# Patient Record
Sex: FEMALE | Race: WHITE | NOT HISPANIC OR LATINO | Employment: FULL TIME | URBAN - METROPOLITAN AREA
[De-identification: names, ages, dates, MRNs, and addresses within clinical notes are randomized per-mention and may not be internally consistent; named-entity substitution may affect disease eponyms.]

---

## 2017-03-07 ENCOUNTER — OFFICE VISIT (OUTPATIENT)
Dept: OTOLARYNGOLOGY | Facility: CLINIC | Age: 22
End: 2017-03-07
Payer: COMMERCIAL

## 2017-03-07 ENCOUNTER — TELEPHONE (OUTPATIENT)
Dept: OTOLARYNGOLOGY | Facility: CLINIC | Age: 22
End: 2017-03-07

## 2017-03-07 VITALS
BODY MASS INDEX: 22.71 KG/M2 | TEMPERATURE: 98 F | DIASTOLIC BLOOD PRESSURE: 95 MMHG | HEIGHT: 66 IN | WEIGHT: 141.31 LBS | SYSTOLIC BLOOD PRESSURE: 136 MMHG | HEART RATE: 74 BPM | RESPIRATION RATE: 18 BRPM

## 2017-03-07 DIAGNOSIS — J35.01 CHRONIC TONSILLITIS: Primary | ICD-10-CM

## 2017-03-07 DIAGNOSIS — J35.8 TONSIL ASYMMETRY: ICD-10-CM

## 2017-03-07 PROCEDURE — 99999 PR PBB SHADOW E&M-NEW PATIENT-LVL III: CPT | Mod: PBBFAC,,, | Performed by: OTOLARYNGOLOGY

## 2017-03-07 PROCEDURE — 99204 OFFICE O/P NEW MOD 45 MIN: CPT | Mod: S$GLB,,, | Performed by: OTOLARYNGOLOGY

## 2017-03-07 RX ORDER — LEVONORGESTREL AND ETHINYL ESTRADIOL 0.1-0.02MG
1 KIT ORAL DAILY
COMMUNITY
End: 2018-07-25 | Stop reason: SDUPTHER

## 2017-03-07 RX ORDER — CELECOXIB 200 MG/1
200 CAPSULE ORAL 2 TIMES DAILY
Qty: 20 CAPSULE | Refills: 2 | Status: SHIPPED | OUTPATIENT
Start: 2017-03-07 | End: 2018-07-17

## 2017-03-07 RX ORDER — HYDROCODONE BITARTRATE AND ACETAMINOPHEN 7.5; 325 MG/15ML; MG/15ML
30 SOLUTION ORAL EVERY 6 HOURS PRN
Qty: 1000 ML | Refills: 0 | Status: SHIPPED | OUTPATIENT
Start: 2017-03-07 | End: 2017-03-17

## 2017-03-07 NOTE — MR AVS SNAPSHOT
[facility]  [address] [facility] [address]  Phone: [phone]  Fax: [phone]                  Elyse Martinez   3/7/2017 9:00 AM   Office Visit    Description:  Female : 1995   Provider:  Yossi Trujillo MD   Department:  [facility]           Reason for Visit     Other           Diagnoses this Visit        Comments    Chronic tonsillitis    -  Primary     Tonsil asymmetry                To Do List           Goals (5 Years of Data)     None       These Medications        Disp Refills Start End    hydrocodone-acetaminophen (HYCET) solution 7.5-325 mg/15mL 1000 mL 0 3/7/2017 3/17/2017    Take 30 mLs by mouth every 6 (six) hours as needed for Pain. - Oral    Pharmacy: [facility] [address] Ph #: [phone]       celecoxib (CELEBREX) 200 MG capsule 20 capsule 2 3/7/2017     Take 1 capsule (200 mg total) by mouth 2 (two) times daily. - Oral    Pharmacy: [facility] - [place], [place] - [address] Ph #: [phone]         [facility]     [facility] -  Assistance  Registered nurses in the [facility] provide clinical advisement, health education, appointment booking, and other advisory services.  Call for this free service at [phone].             Medications           Message regarding Medications     Verify the changes and/or additions to your medication regime listed below are the same as discussed with your clinician today.  If any of these changes or additions are incorrect, please notify your healthcare provider.        START taking these NEW medications        Refills    hydrocodone-acetaminophen (HYCET) solution 7.5-325 mg/15mL 0    Sig: Take 30 mLs by mouth every 6 (six) hours as needed for Pain.    Class: Normal    Route: Oral    celecoxib (CELEBREX) 200 MG capsule 2    Sig: Take 1 capsule (200 mg total) by mouth 2  "(two) times daily.    Class: Normal    Route: Oral           Verify that the below list of medications is an accurate representation of the medications you are currently taking.  If none reported, the list may be blank. If incorrect, please contact your healthcare provider. Carry this list with you in case of emergency.           Current Medications     levonorgestrel-ethinyl estradiol (AVIANE,ALESSE,LESSINA) 0.1-20 mg-mcg per tablet Take 1 tablet by mouth once daily.    celecoxib (CELEBREX) 200 MG capsule Take 1 capsule (200 mg total) by mouth 2 (two) times daily.    hydrocodone-acetaminophen (HYCET) solution 7.5-325 mg/15mL Take 30 mLs by mouth every 6 (six) hours as needed for Pain.           Clinical Reference Information           Your Vitals Were     BP Pulse Temp Resp Height Weight    136/95 74 98.1 °F (36.7 °C) (Tympanic) 18 5' 6" (1.676 m) 64.1 kg (141 lb 5 oz)    BMI                22.81 kg/m2          Blood Pressure          Most Recent Value    BP  (!)  136/95      Allergies as of 3/7/2017     No Known Allergies      Immunizations Administered on Date of Encounter - 3/7/2017     None      Instructions        Adult Tonsillectomy  The tonsils are two small masses of tissue at the back of the throat. They are part of the bodys immune system, which helps the body fight disease. In some people, the tonsils become infected or enlarged. This can cause severe sore throats, snoring, or other problems. Tonsillectomy is surgery to remove the tonsils. This sheet tells you more about this surgery and what to expect.    Preparing for Surgery  Prepare as you have been told. Tell your doctor about all medications you take. This includes over-the-counter drugs. It also includes herbs and other supplements. You may need to stop taking some or all of them before surgery. Also, follow any directions youre given for not eating or drinking before surgery.    The Day of Surgery  YOU MAY TAKE YOUR MEDICATIONS WITH A SIP OF " WATER THE MORNING OF SURGERY, BUT OTHER THAN THAT  DO NOT EAT OR DRINK THE DAY OF SURGERY  The surgery takes about 60 minutes. You will likely go home on the same day.  Before the surgery begins:  · An IV line is put into a vein in your arm or hand. This line supplies fluids and medications.  · To keep you free of pain during the surgery, youre given general anesthesia. This medication puts you into a state like deep sleep through the surgery.  During the surgery:  · A special device is used to keep the mouth open.  · Other tools are used to remove the tonsils from the back of the throat. The tissue is taken out through the mouth.  · The device holding the mouth open is then removed.  After the surgery: You will be taken to a recovery room. Healthcare staff will make sure you can drink some liquids. They will also make sure your pain is being managed. When you are ready to leave the hospital, you will need to be driven home by an adult family member or friend.  Recovering at Home  It will likely take about 3 weeks to heal from the surgery. During your recovery:  · Expect to have significant throat pain. You may also feel pain in your ears. This is referred pain from the throat, and is normal. Your post-surgery pain may come and go. It may be worse on the 4th or 5th day after surgery.  · Talk as little as possible, if it is painful.  · Take pain medication as directed.  · Do not drive while you are on narcotic pain medication. Expect to feel sleepy or dizzy while you are taking this medication.  · Do not use aspirin for 14 days after surgery unless your doctor says its okay.  · Use 2 or 3 pillows under your head while resting. This will help keep swelling down.  · Drink lots of chilled liquids. Water, non-citrus juices, and frozen juice bars are good choices.  · Eat cold foods and soft foods, which are easiest to swallow. Try foods such as ice cream, gelatin, scrambled eggs, pasta, and mashed potatoes.  · Avoid  foods that require a lot of chewing. Also avoid foods that may scratch the throat, such as toast or potato chips. Avoid hot, spicy, or acidic foods.  · Avoid strenuous activity for 2-3 weeks after surgery.  · Be aware that white patches will form in the throat during healing. These are scabs and are not a sign of infection. The patches will come off in 1-2 weeks and may cause bleeding. To minimize bleeding, drink lots of fluids. Gargling with cold water can help.  Call the Doctor If You Have Any of the Following:  · Fever of 100.4°F (38°C) or higher, or as directed by your healthcare provider  · Bright red bleeding from the mouth or nose  · Signs of dehydration (dark urine, urinating less often)  · Heavy or persistent bleeding in the throat at any time  · Other signs or symptoms as indicated by your doctor   Follow-Up  Schedule a follow-up visit with your doctor for 1 month after surgery. During this visit, the doctor will make sure you are healing well. Ask any questions you have about the surgery or your recovery.  Risks and Possible Complications Include:  · Infection  · Bleeding  · Injury to the lips or teeth  · Painful swallowing during recovery  · The need for a second surgery  · Risks of anesthesia (the anesthesiologist will discuss these with you)                  Language Assistance Services     ATTENTION: Language assistance services are available, free of charge. Please call [phone].      ATENCIÓN: Si habla apolinar, tiene a mcgarry disposición servicios gratuitos de asistencia lingüística. Llame al [phone].     ANDREA Ý: N?u b?n nói Ti?ng Vi?t, có các d?ch v? h? tr? ngôn ng? mi?n phí dành cho b?n. G?i s? [phone].         [facility] complies with applicable Federal civil rights laws and does not discriminate on the basis of race, color, national origin, age, disability, or sex.

## 2017-03-07 NOTE — TELEPHONE ENCOUNTER
Patient will contact us back to schedule tonsillectomy with Dr. Trujillo at her convenience. Consents have been signed for surgery.

## 2017-03-07 NOTE — PATIENT INSTRUCTIONS
Adult Tonsillectomy  The tonsils are two small masses of tissue at the back of the throat. They are part of the bodys immune system, which helps the body fight disease. In some people, the tonsils become infected or enlarged. This can cause severe sore throats, snoring, or other problems. Tonsillectomy is surgery to remove the tonsils. This sheet tells you more about this surgery and what to expect.    Preparing for Surgery  Prepare as you have been told. Tell your doctor about all medications you take. This includes over-the-counter drugs. It also includes herbs and other supplements. You may need to stop taking some or all of them before surgery. Also, follow any directions youre given for not eating or drinking before surgery.    The Day of Surgery  YOU MAY TAKE YOUR MEDICATIONS WITH A SIP OF WATER THE MORNING OF SURGERY, BUT OTHER THAN THAT  DO NOT EAT OR DRINK THE DAY OF SURGERY  The surgery takes about 60 minutes. You will likely go home on the same day.  Before the surgery begins:  · An IV line is put into a vein in your arm or hand. This line supplies fluids and medications.  · To keep you free of pain during the surgery, youre given general anesthesia. This medication puts you into a state like deep sleep through the surgery.  During the surgery:  · A special device is used to keep the mouth open.  · Other tools are used to remove the tonsils from the back of the throat. The tissue is taken out through the mouth.  · The device holding the mouth open is then removed.  After the surgery: You will be taken to a recovery room. Healthcare staff will make sure you can drink some liquids. They will also make sure your pain is being managed. When you are ready to leave the hospital, you will need to be driven home by an adult family member or friend.  Recovering at Home  It will likely take about 3 weeks to heal from the surgery. During your recovery:  · Expect to have significant throat pain. You may also  feel pain in your ears. This is referred pain from the throat, and is normal. Your post-surgery pain may come and go. It may be worse on the 4th or 5th day after surgery.  · Talk as little as possible, if it is painful.  · Take pain medication as directed.  · Do not drive while you are on narcotic pain medication. Expect to feel sleepy or dizzy while you are taking this medication.  · Do not use aspirin for 14 days after surgery unless your doctor says its okay.  · Use 2 or 3 pillows under your head while resting. This will help keep swelling down.  · Drink lots of chilled liquids. Water, non-citrus juices, and frozen juice bars are good choices.  · Eat cold foods and soft foods, which are easiest to swallow. Try foods such as ice cream, gelatin, scrambled eggs, pasta, and mashed potatoes.  · Avoid foods that require a lot of chewing. Also avoid foods that may scratch the throat, such as toast or potato chips. Avoid hot, spicy, or acidic foods.  · Avoid strenuous activity for 2-3 weeks after surgery.  · Be aware that white patches will form in the throat during healing. These are scabs and are not a sign of infection. The patches will come off in 1-2 weeks and may cause bleeding. To minimize bleeding, drink lots of fluids. Gargling with cold water can help.  Call the Doctor If You Have Any of the Following:  · Fever of 100.4°F (38°C) or higher, or as directed by your healthcare provider  · Bright red bleeding from the mouth or nose  · Signs of dehydration (dark urine, urinating less often)  · Heavy or persistent bleeding in the throat at any time  · Other signs or symptoms as indicated by your doctor   Follow-Up  Schedule a follow-up visit with your doctor for 1 month after surgery. During this visit, the doctor will make sure you are healing well. Ask any questions you have about the surgery or your recovery.  Risks and Possible Complications Include:  · Infection  · Bleeding  · Injury to the lips or  teeth  · Painful swallowing during recovery  · The need for a second surgery  · Risks of anesthesia (the anesthesiologist will discuss these with you)

## 2017-03-08 NOTE — PROGRESS NOTES
Referring Provider:    No referring provider defined for this encounter.  Subjective:   Patient: Elyse Martinez [id number], :1995   Visit date:3/7/2017 7:27 AM    Chief Complaint:  Other (patient having trouble with her tonsils.)    HPI:  Elyse is a 21 y.o. female who I was asked to see in consultation for evaluation of the following issue(s):    Elyse is a very pleasant 21-year-old here to see me for the first time.  She works in the emergency room here in our hospital.  She reports that for the past few years, she has had chronic pain in the tonsillar area.  She has frequent stones extrude from the tonsils.  The left side she, she has actually had some significant bleeding when the stones have spontaneously extruded.  She has been on numerous rounds of antibiotics through an outside physician without significant improvement.  She feels like it is almost always the left side that is most bothersome.  Pain is typically mild to moderate crampy in nature but can sometimes become quite severe.    Review of Systems:  Negative unless checked off.  Gen:  []fever   []fatigue  HENT:  []nosebleeds  []dental problem   Eyes:  []photophobia  []visual disturbance  Resp:  []chest tightness []wheezing  Card:  []chest pain  []leg swelling  GI:  []abdominal pain []blood in stool  :  []dysuria  []hematuria  Musc:  []joint swelling  []gait problem  Skin:  []color change  []pallor  Neuro:  []seizures  []numbness  Hem:  []bruise/bleed easily  Psych:  []hallucinations  []behavioral problems  Allergy/Imm: has No Known Allergies.    Her meds, allergies, medical, surgical, social & family histories were reviewed & updated:  -     She has a current medication list which includes the following prescription(s): levonorgestrel-ethinyl estradiol, celecoxib, and hydrocodone-apap 2.5-108 mg/5 ml.  -     She  has no past medical history on file.   -     She  does not have a problem list on file.   -     She  has no past surgical history  "on file.  -     She  reports that she has never smoked. She does not have any smokeless tobacco history on file.  -     Her family history is not on file.  -     She has No Known Allergies.    Objective:     Physical Exam:  Vitals:  BP (!) 136/95  Pulse 74  Temp 98.1 °F (36.7 °C) (Tympanic)   Resp 18  Ht 5' 6" (1.676 m)  Wt 64.1 kg (141 lb 5 oz)  BMI 22.81 kg/m2  General appearance:  Well developed, well nourished    Eyes:  Extraocular motions intact, PERRL    Communication:  no hoarseness, no dysphonia    Ears:  Otoscopy of external auditory canals and tympanic membranes was normal, clinical speech reception thresholds grossly intact, no mass/lesion of auricle.  Nose:  No masses/lesions of external nose, nasal mucosa, septum, and turbinates were within normal limits.  Mouth: Left tonsil is significantly larger than the right.  There is moderate inflammation as well as some infectious debris on the left side.      Cardiovascular:  No pedal edema; Radial Pulses +2     Neck & Lymphatics:  No cervical lymphadenopathy, no neck mass/crepitus/ asymmetry, trachea is midline, no thyroid enlargement/tenderness/mass.    Psych: Oriented x3,  Alert with normal mood and affect.     Respiration/Chest:  Symmetric expansion during respiration, normal respiratory effort.    Skin:  Warm and intact. No ulcerations of face, scalp, neck.    Assessment & Plan:   Elyse was seen today for other.    Diagnoses and all orders for this visit:    Chronic tonsillitis    Tonsil asymmetry    Other orders  -     hydrocodone-acetaminophen (HYCET) solution 7.5-325 mg/15mL; Take 30 mLs by mouth every 6 (six) hours as needed for Pain.  -     celecoxib (CELEBREX) 200 MG capsule; Take 1 capsule (200 mg total) by mouth 2 (two) times daily.      Recommend tonsillectomy for asymmetry and chronic tonsillitis.       The risks and benefits of surgery were discussed at length including, but not limited to, respiratory distress, hemorrhage, regurgitation " of food/liquids into the nasopharynx, voice changes, dehydration and pain.  The patient had the opportunity to ask questions to their satisfaction.          We discussed her medical conditions, treatments and plan.  Elyse should return to clinic if any issues arise (symptoms worsen or persist), otherwise we will see her back in the clinic after surgery.    Thank you for allowing me to participate in the care of Elyse.    Yossi Trujillo MD

## 2017-03-27 ENCOUNTER — TELEPHONE (OUTPATIENT)
Dept: OTOLARYNGOLOGY | Facility: CLINIC | Age: 22
End: 2017-03-27

## 2017-03-27 DIAGNOSIS — J35.8 TONSIL ASYMMETRY: ICD-10-CM

## 2017-03-27 DIAGNOSIS — J35.01 CHRONIC TONSILLITIS: Primary | ICD-10-CM

## 2017-03-27 NOTE — TELEPHONE ENCOUNTER
Attempted return call to patient. No voicemail setup to leave a message. Upon last office visit patient will contact us when she is ready to pick a surgery date. Consents have been signed previously. Will try back later.

## 2017-03-27 NOTE — TELEPHONE ENCOUNTER
----- Message from Orlando Hutton sent at 3/24/2017  3:13 PM CDT -----  Contact: Patient  Please call patient back regarding questions about her surgery.  Pt can be reached @ ..[phone] (home) Thank you/NH

## 2017-05-30 ENCOUNTER — TELEPHONE (OUTPATIENT)
Dept: OTOLARYNGOLOGY | Facility: CLINIC | Age: 22
End: 2017-05-30

## 2017-05-30 RX ORDER — OXYCODONE HCL 5 MG/5 ML
5 SOLUTION, ORAL ORAL EVERY 4 HOURS PRN
Qty: 350 ML | Refills: 0 | Status: SHIPPED | OUTPATIENT
Start: 2017-05-30 | End: 2018-07-17

## 2017-05-30 NOTE — TELEPHONE ENCOUNTER
Spoke with patient to confirm 2 scripts to  from pharmacy. Spoke with Robert at [facility] to verify.

## 2017-05-30 NOTE — TELEPHONE ENCOUNTER
----- Message from Rose Moyer sent at 5/30/2017  4:54 PM CDT -----  Pt at [phone]//she is calling to ask if she is suppose to get 2 scripts or just one//she knows she is getting a pain med but not sure about the other one//please call//lorene/joel

## 2017-05-31 ENCOUNTER — TELEPHONE (OUTPATIENT)
Dept: OTOLARYNGOLOGY | Facility: CLINIC | Age: 22
End: 2017-05-31

## 2017-05-31 NOTE — PRE-PROCEDURE INSTRUCTIONS
Pre op instructions reviewed with patient per phone:    To confirm, Your surgeon has instructed you:  Surgery is scheduled 6/1/17at ENT.      [facility] main lobby by E[facility].      INSTRUCTIONS IMPORTANT!!!  ¨ Do not eat, drink, or smoke after 12 midnight-including water. OK to brush teeth, no gum, candy or mints!    ¨ Take only these medicines with a small swallow of water-morning of surgery.  NONE  ____  Do not wear makeup, including mascara.  ____  No powder, lotions or creams to surgical area.  ____  Please remove all jewelry, including piercings and leave at home.  ____  No money or valuables needed. Please leave at home.  ____  Please bring identification and insurance information to hospital.  ____  If going home the same day, arrange for a ride home. You will not be able to   drive if Anesthesia was used.  ____  Children, under 12 years old, must remain in the waiting room with an adult.  They are not allowed in patient areas.  ____  Wear loose fitting clothing. Allow for dressings, bandages.  ____  Stop Aspirin, Ibuprofen, Motrin and Aleve at least 5-7 days before surgery, unless otherwise instructed by your doctor, or the nurse.   You MAY use Tylenol/acetaminophen until day of surgery.  ____  If you take diabetic medication, do not take am of surgery unless instructed by   Doctor.  ____ Stop taking any Fish Oil supplement or any Vitamins that contain Vitamin E at least 5 days prior to surgery.          Bathing Instructions-- The night before surgery and the morning prior to coming to the hospital:   -Do not shave the surgical area.   -Shower and wash your hair and body as usual with anti-bacterial  soap and shampoo.   -Rinse your hair and body completely.   -Use one packet of hibiclens to wash the surgical site (using your hand) gently for 5 minutes.  Do not scrub you skin too hard.   -Do not use hibiclens on your head, face, or genitals.   -Do not wash with  anti-bacterial soap after you use the hibiclens.   -Rinse your body thoroughly.   -Dry with clean, soft towel.  Do not use lotion, cream, deodorant, or powders on   the surgical site.    Use antibacterial soap in place of hibiclens if your surgery is on the head, face or genitals.         Surgical Site Infection    Prevention of surgical site infections:     -Keep incisions clean and dry.   -Do not soak/submerge incisions in water until completely healed.   -Do not apply lotions, powders, creams, or deodorants to site.   -Always make sure hands are cleaned with antibacterial soap/ alcohol-based   prior to touching the surgical site.  (This includes doctors, nurses, staff, and yourself.)    Signs and symptoms:   -Redness and pain around the area where you had surgery   -Drainage of cloudy fluid from your surgical wound   -Fever over 100.4  I have read or had read and explained to me, and understand the above information.

## 2017-05-31 NOTE — TELEPHONE ENCOUNTER
Surgery arrival time of 7:00 am with Dr. Trujillo for 6/1/17 confirmed with patient. Reminder of NPO at midnight with verbal understanding.

## 2017-06-01 ENCOUNTER — ANESTHESIA (OUTPATIENT)
Dept: SURGERY | Facility: HOSPITAL | Age: 22
End: 2017-06-01
Payer: COMMERCIAL

## 2017-06-01 ENCOUNTER — HOSPITAL ENCOUNTER (OUTPATIENT)
Facility: HOSPITAL | Age: 22
Discharge: HOME | End: 2017-06-01
Attending: OTOLARYNGOLOGY | Admitting: OTOLARYNGOLOGY
Payer: COMMERCIAL

## 2017-06-01 ENCOUNTER — SURGERY (OUTPATIENT)
Age: 22
End: 2017-06-01

## 2017-06-01 ENCOUNTER — ANESTHESIA EVENT (OUTPATIENT)
Dept: SURGERY | Facility: HOSPITAL | Age: 22
End: 2017-06-01
Payer: COMMERCIAL

## 2017-06-01 VITALS
BODY MASS INDEX: 22.39 KG/M2 | TEMPERATURE: 98 F | RESPIRATION RATE: 18 BRPM | OXYGEN SATURATION: 96 % | HEART RATE: 68 BPM | HEIGHT: 66 IN | DIASTOLIC BLOOD PRESSURE: 53 MMHG | WEIGHT: 139.31 LBS | SYSTOLIC BLOOD PRESSURE: 113 MMHG

## 2017-06-01 DIAGNOSIS — J35.01 CHRONIC TONSILLITIS: Primary | ICD-10-CM

## 2017-06-01 DIAGNOSIS — J35.8 TONSIL ASYMMETRY: ICD-10-CM

## 2017-06-01 LAB
B-HCG UR QL: NEGATIVE
CTP QC/QA: YES

## 2017-06-01 PROCEDURE — 71000033 HC RECOVERY, INTIAL HOUR: Performed by: OTOLARYNGOLOGY

## 2017-06-01 PROCEDURE — 36000706: Performed by: OTOLARYNGOLOGY

## 2017-06-01 PROCEDURE — 25000003 PHARM REV CODE 250: Performed by: ANESTHESIOLOGY

## 2017-06-01 PROCEDURE — 36000707: Performed by: OTOLARYNGOLOGY

## 2017-06-01 PROCEDURE — 88304 TISSUE EXAM BY PATHOLOGIST: CPT | Performed by: PATHOLOGY

## 2017-06-01 PROCEDURE — 63600175 PHARM REV CODE 636 W HCPCS: Performed by: NURSE ANESTHETIST, CERTIFIED REGISTERED

## 2017-06-01 PROCEDURE — 71000015 HC POSTOP RECOV 1ST HR: Performed by: OTOLARYNGOLOGY

## 2017-06-01 PROCEDURE — 88304 TISSUE EXAM BY PATHOLOGIST: CPT | Mod: 26,,, | Performed by: PATHOLOGY

## 2017-06-01 PROCEDURE — 42826 REMOVAL OF TONSILS: CPT | Mod: ,,, | Performed by: OTOLARYNGOLOGY

## 2017-06-01 PROCEDURE — 81025 URINE PREGNANCY TEST: CPT | Performed by: OTOLARYNGOLOGY

## 2017-06-01 PROCEDURE — 25000003 PHARM REV CODE 250: Performed by: NURSE ANESTHETIST, CERTIFIED REGISTERED

## 2017-06-01 PROCEDURE — 37000008 HC ANESTHESIA 1ST 15 MINUTES: Performed by: OTOLARYNGOLOGY

## 2017-06-01 PROCEDURE — 27201423 OPTIME MED/SURG SUP & DEVICES STERILE SUPPLY: Performed by: OTOLARYNGOLOGY

## 2017-06-01 PROCEDURE — 37000009 HC ANESTHESIA EA ADD 15 MINS: Performed by: OTOLARYNGOLOGY

## 2017-06-01 PROCEDURE — 63600175 PHARM REV CODE 636 W HCPCS: Performed by: ANESTHESIOLOGY

## 2017-06-01 RX ORDER — ROCURONIUM BROMIDE 10 MG/ML
INJECTION, SOLUTION INTRAVENOUS
Status: DISCONTINUED | OUTPATIENT
Start: 2017-06-01 | End: 2017-06-01

## 2017-06-01 RX ORDER — HYDROCODONE BITARTRATE AND ACETAMINOPHEN 5; 325 MG/1; MG/1
1 TABLET ORAL EVERY 4 HOURS PRN
Status: DISCONTINUED | OUTPATIENT
Start: 2017-06-01 | End: 2017-06-01 | Stop reason: HOSPADM

## 2017-06-01 RX ORDER — LIDOCAINE HCL/PF 100 MG/5ML
SYRINGE (ML) INTRAVENOUS
Status: DISCONTINUED | OUTPATIENT
Start: 2017-06-01 | End: 2017-06-01

## 2017-06-01 RX ORDER — ONDANSETRON 2 MG/ML
4 INJECTION INTRAMUSCULAR; INTRAVENOUS DAILY PRN
Status: DISCONTINUED | OUTPATIENT
Start: 2017-06-01 | End: 2017-06-01 | Stop reason: HOSPADM

## 2017-06-01 RX ORDER — OXYCODONE HYDROCHLORIDE 5 MG/1
5 TABLET ORAL
Status: DISCONTINUED | OUTPATIENT
Start: 2017-06-01 | End: 2017-06-01 | Stop reason: HOSPADM

## 2017-06-01 RX ORDER — SODIUM CHLORIDE, SODIUM LACTATE, POTASSIUM CHLORIDE, CALCIUM CHLORIDE 600; 310; 30; 20 MG/100ML; MG/100ML; MG/100ML; MG/100ML
INJECTION, SOLUTION INTRAVENOUS CONTINUOUS PRN
Status: DISCONTINUED | OUTPATIENT
Start: 2017-06-01 | End: 2017-06-01

## 2017-06-01 RX ORDER — PROPOFOL 10 MG/ML
VIAL (ML) INTRAVENOUS
Status: DISCONTINUED | OUTPATIENT
Start: 2017-06-01 | End: 2017-06-01

## 2017-06-01 RX ORDER — HYDROMORPHONE HYDROCHLORIDE 2 MG/ML
0.2 INJECTION, SOLUTION INTRAMUSCULAR; INTRAVENOUS; SUBCUTANEOUS EVERY 5 MIN PRN
Status: DISCONTINUED | OUTPATIENT
Start: 2017-06-01 | End: 2017-06-01 | Stop reason: HOSPADM

## 2017-06-01 RX ORDER — DEXAMETHASONE SODIUM PHOSPHATE 4 MG/ML
INJECTION, SOLUTION INTRA-ARTICULAR; INTRALESIONAL; INTRAMUSCULAR; INTRAVENOUS; SOFT TISSUE
Status: DISCONTINUED | OUTPATIENT
Start: 2017-06-01 | End: 2017-06-01

## 2017-06-01 RX ORDER — MIDAZOLAM HYDROCHLORIDE 1 MG/ML
INJECTION, SOLUTION INTRAMUSCULAR; INTRAVENOUS
Status: DISCONTINUED | OUTPATIENT
Start: 2017-06-01 | End: 2017-06-01

## 2017-06-01 RX ORDER — FENTANYL CITRATE 50 UG/ML
25 INJECTION, SOLUTION INTRAMUSCULAR; INTRAVENOUS EVERY 5 MIN PRN
Status: DISCONTINUED | OUTPATIENT
Start: 2017-06-01 | End: 2017-06-01 | Stop reason: HOSPADM

## 2017-06-01 RX ORDER — SODIUM CHLORIDE 9 MG/ML
3 INJECTION, SOLUTION INTRAMUSCULAR; INTRAVENOUS; SUBCUTANEOUS
Status: DISCONTINUED | OUTPATIENT
Start: 2017-06-01 | End: 2017-06-01 | Stop reason: HOSPADM

## 2017-06-01 RX ORDER — DIPHENHYDRAMINE HYDROCHLORIDE 50 MG/ML
25 INJECTION INTRAMUSCULAR; INTRAVENOUS EVERY 6 HOURS PRN
Status: DISCONTINUED | OUTPATIENT
Start: 2017-06-01 | End: 2017-06-01 | Stop reason: HOSPADM

## 2017-06-01 RX ORDER — SODIUM CHLORIDE 9 MG/ML
3 INJECTION, SOLUTION INTRAMUSCULAR; INTRAVENOUS; SUBCUTANEOUS EVERY 8 HOURS
Status: DISCONTINUED | OUTPATIENT
Start: 2017-06-01 | End: 2017-06-01 | Stop reason: HOSPADM

## 2017-06-01 RX ORDER — NAPROXEN SODIUM 220 MG
375 TABLET ORAL 2 TIMES DAILY PRN
COMMUNITY

## 2017-06-01 RX ORDER — MEPERIDINE HYDROCHLORIDE 50 MG/ML
12.5 INJECTION INTRAMUSCULAR; INTRAVENOUS; SUBCUTANEOUS ONCE AS NEEDED
Status: COMPLETED | OUTPATIENT
Start: 2017-06-01 | End: 2017-06-01

## 2017-06-01 RX ORDER — ONDANSETRON 2 MG/ML
INJECTION INTRAMUSCULAR; INTRAVENOUS
Status: DISCONTINUED | OUTPATIENT
Start: 2017-06-01 | End: 2017-06-01

## 2017-06-01 RX ORDER — ACETAMINOPHEN 10 MG/ML
1000 INJECTION, SOLUTION INTRAVENOUS ONCE
Status: COMPLETED | OUTPATIENT
Start: 2017-06-01 | End: 2017-06-01

## 2017-06-01 RX ORDER — FENTANYL CITRATE 50 UG/ML
INJECTION, SOLUTION INTRAMUSCULAR; INTRAVENOUS
Status: DISCONTINUED | OUTPATIENT
Start: 2017-06-01 | End: 2017-06-01

## 2017-06-01 RX ORDER — LORAZEPAM 2 MG/ML
0.25 INJECTION INTRAMUSCULAR ONCE AS NEEDED
Status: DISCONTINUED | OUTPATIENT
Start: 2017-06-01 | End: 2017-06-01 | Stop reason: HOSPADM

## 2017-06-01 RX ORDER — SUCCINYLCHOLINE CHLORIDE 20 MG/ML
INJECTION INTRAMUSCULAR; INTRAVENOUS
Status: DISCONTINUED | OUTPATIENT
Start: 2017-06-01 | End: 2017-06-01

## 2017-06-01 RX ADMIN — ONDANSETRON 4 MG: 2 INJECTION, SOLUTION INTRAMUSCULAR; INTRAVENOUS at 09:06

## 2017-06-01 RX ADMIN — SUCCINYLCHOLINE CHLORIDE 100 MG: 20 INJECTION, SOLUTION INTRAMUSCULAR; INTRAVENOUS at 09:06

## 2017-06-01 RX ADMIN — ROCURONIUM BROMIDE 5 MG: 10 INJECTION, SOLUTION INTRAVENOUS at 09:06

## 2017-06-01 RX ADMIN — MIDAZOLAM HYDROCHLORIDE 2 MG: 1 INJECTION, SOLUTION INTRAMUSCULAR; INTRAVENOUS at 09:06

## 2017-06-01 RX ADMIN — DEXAMETHASONE SODIUM PHOSPHATE 8 MG: 4 INJECTION, SOLUTION INTRA-ARTICULAR; INTRALESIONAL; INTRAMUSCULAR; INTRAVENOUS; SOFT TISSUE at 09:06

## 2017-06-01 RX ADMIN — SODIUM CHLORIDE, SODIUM LACTATE, POTASSIUM CHLORIDE, AND CALCIUM CHLORIDE: 600; 310; 30; 20 INJECTION, SOLUTION INTRAVENOUS at 09:06

## 2017-06-01 RX ADMIN — MEPERIDINE HYDROCHLORIDE 12.5 MG: 50 INJECTION INTRAMUSCULAR; INTRAVENOUS; SUBCUTANEOUS at 10:06

## 2017-06-01 RX ADMIN — FENTANYL CITRATE 50 MCG: 50 INJECTION, SOLUTION INTRAMUSCULAR; INTRAVENOUS at 09:06

## 2017-06-01 RX ADMIN — ACETAMINOPHEN 1000 MG: 10 INJECTION, SOLUTION INTRAVENOUS at 10:06

## 2017-06-01 RX ADMIN — LIDOCAINE HYDROCHLORIDE 40 MG: 20 INJECTION, SOLUTION INTRAVENOUS at 09:06

## 2017-06-01 RX ADMIN — PROPOFOL 160 MG: 10 INJECTION, EMULSION INTRAVENOUS at 09:06

## 2017-06-01 RX ADMIN — FENTANYL CITRATE 50 MCG: 50 INJECTION, SOLUTION INTRAMUSCULAR; INTRAVENOUS at 10:06

## 2017-06-01 NOTE — ANESTHESIA RELEASE NOTE
"Anesthesia Release from PACU Note    Patient: Elyse Martinez    Procedure(s) Performed: Procedure(s) (LRB):  TONSILLECTOMY (Bilateral)    Anesthesia type: general    Post pain: Adequate analgesia    Post assessment: no apparent anesthetic complications, tolerated procedure well and no evidence of recall    Last Vitals:   Visit Vitals  /64   Pulse 104   Temp 37 °C (98.6 °F) (Temporal)   Resp 18   Ht 5' 6" (1.676 m)   Wt 63.2 kg (139 lb 5.3 oz)   SpO2 99%   Breastfeeding? No   BMI 22.49 kg/m²       Post vital signs: stable    Level of consciousness: responds to stimulation    Nausea/Vomiting: no nausea/no vomiting    Complications: none    Airway Patency: patent    Respiratory: unassisted    Cardiovascular: stable and blood pressure at baseline    Hydration: euvolemic  "

## 2017-06-01 NOTE — DISCHARGE SUMMARY
[facility]  Discharge Note  Short Stay    Admit Date: 6/1/2017    Discharge Date and Time: 06/01/2017 9:22 AM     Attending Physician: Yossi Trujillo MD     Discharge Provider: Yossi Trujillo    Diagnoses:  Active Hospital Problems    Diagnosis  POA   No active problems to display.      Resolved Hospital Problems    Diagnosis Date Resolved POA    Chronic tonsillitis [J35.01] 06/01/2017 Yes    Tonsil asymmetry [J35.8] 06/01/2017 Yes       Discharged Condition: good    Hospital Course: Patient was admitted for an outpatient procedure and tolerated the procedure well with no complications.    Final Diagnoses: Same as principle problem    Disposition: Home or Self Care    Follow up/Patient Instructions:    Medications:  Reconciled Home Medications: Current Discharge Medication List      CONTINUE these medications which have NOT CHANGED    Details   levonorgestrel-ethinyl estradiol (AVIANE,ALESSE,LESSINA) 0.1-20 mg-mcg per tablet Take 1 tablet by mouth once daily.      naproxen sodium (ANAPROX) 220 MG tablet Take 375 mg by mouth 2 (two) times daily as needed.      celecoxib (CELEBREX) 200 MG capsule Take 1 capsule (200 mg total) by mouth 2 (two) times daily.  Qty: 20 capsule, Refills: 2      oxycodone (ROXICODONE) 5 mg/5 mL Soln Take 5 mLs (5 mg total) by mouth every 4 (four) hours as needed (you may take 10 mL for severe pain).  Qty: 350 mL, Refills: 0               Discharge Procedure Orders  Diet general     Activity as tolerated     No dressing needed     Call MD for:  persistent nausea and vomiting     Call MD for:  difficulty breathing, headache or visual disturbances     Call MD for:  severe uncontrolled pain           Discharge Procedure Orders (must include Diet, Follow-up, Activity):    Discharge Procedure Orders (must include Diet, Follow-up, Activity)  Diet general     Activity as tolerated     No dressing needed     Call MD for:  persistent nausea and vomiting     Call MD for:  difficulty breathing,  headache or visual disturbances     Call MD for:  severe uncontrolled pain          Follow-up Information     Shanice Langley PA-C.    Specialty:  Otolaryngology  Contact information:  [address] [facility] [address] [phone]

## 2017-06-01 NOTE — ANESTHESIA POSTPROCEDURE EVALUATION
"Anesthesia Post Evaluation    Patient: Elyse Martinez    Procedure(s) Performed: Procedure(s) (LRB):  TONSILLECTOMY (Bilateral)    Final Anesthesia Type: general  Patient participation: Yes- Able to Participate  Level of consciousness: awake and alert  Post-procedure vital signs: reviewed and stable  Pain management: adequate  Airway patency: patent  PONV status at discharge: No PONV  Anesthetic complications: no      Cardiovascular status: blood pressure returned to baseline  Respiratory status: unassisted  Hydration status: euvolemic          Visit Vitals  BP (!) 113/53   Pulse 68   Temp 36.9 °C (98.4 °F) (Temporal)   Resp 18   Ht 5' 6" (1.676 m)   Wt 63.2 kg (139 lb 5.3 oz)   SpO2 96%   Breastfeeding? No   BMI 22.49 kg/m²       Pain/Sharonda Score: Pain Assessment Performed: Yes (6/1/2017 10:15 AM)  Presence of Pain: complains of pain/discomfort (6/1/2017 11:01 AM)  Pain Rating Prior to Med Admin: 5 (6/1/2017 10:30 AM)  Pain Rating Post Med Admin: 3 (6/1/2017 11:01 AM)  Sharonda Score: 9 (6/1/2017 11:01 AM)      "

## 2017-06-01 NOTE — OP NOTE
Operative Note       Surgery Date: 6/1/2017     Assistant: None    Implants:  None    Surgeon(s) and Role:     * Yossi Trujillo MD - Primary    Pre-op Diagnosis:  Chronic tonsillitis [J35.01]  Tonsil asymmetry [J35.8]    Post-op Diagnosis: Post-Op Diagnosis Codes:     * Chronic tonsillitis [J35.01]     * Tonsil asymmetry [J35.8]    Anesthesia: GETA    Technical Procedures Used:     Tonsillectomy    Procedure in Detail/Findings:    Chronic inflammation    Complications: No    Estimated Blood Loss: minimal           Specimens     Tonsils          Justification for the operation:     Elyse has a history of chronic/recurrent tonsillitis requiring multiple courses of antibiotics.  There still continues to be pain and exudate.  Risks, benefits and alternatives were discussed at length.    Procedure in Detail:      Next the tonsillectomy was performed.  We placed a mouth gag retractor into the oral cavity to displace the tongue inferiorly.  The tonsil size was noted to be 3+ with normal appearance.  We grasped the right tonsil with the curved Allis and retracted it medially.  Using the Coblator set on 7 and 3, we removed the tonsil.  Care was taken not to disrupt the anterior or posterior tonsillar pillar or to dissect into the underlying musculature.  Any areas of concern for bleeding were cauterized.          The stomach was suctioned with an NG tube.             Disposition: PACU - hemodynamically stable.           Condition: Good    Attestation:  I was present and scrubbed for the entire procedure.

## 2017-06-01 NOTE — ANESTHESIA PREPROCEDURE EVALUATION
06/01/2017  Elyse Martinez is a 22 y.o., female.    Anesthesia Evaluation    I have reviewed the Patient Summary Reports.        Review of Systems  Anesthesia Hx:  No problems with previous Anesthesia    EENT/Dental:   Chronic Tonsillitis   Cardiovascular:   Exercise tolerance: good NYHA Classification I        Physical Exam   Airway/Jaw/Neck:  Airway Findings: Mallampati: I TM Distance: Normal, at least 6 cm      Dental:  Dental Findings:   Chest/Lungs:  Chest/Lungs Findings: Clear to auscultation     Heart/Vascular:  Heart Findings: Rate: Normal        Mental Status:  Mental Status Findings:  Cooperative, Alert and Oriented         Anesthesia Plan  Type of Anesthesia, risks & benefits discussed:  Anesthesia Type:  general  Patient's Preference:   Intra-op Monitoring Plan:   Intra-op Monitoring Plan Comments:   Post Op Pain Control Plan: IV/PO Opiods PRN  Post Op Pain Control Plan Comments:   Induction:    Beta Blocker:  Patient is not currently on a Beta-Blocker (No further documentation required).       Informed Consent: Patient understands risks and agrees with Anesthesia plan.  Questions answered. Anesthesia consent signed with patient.  ASA Score: 1     Day of Surgery Review of History & Physical: I have interviewed and examined the patient. I have reviewed the patient's H&P dated:        Anesthesia Plan Notes: POC Preg Test, Ur Negative  Negative         Ready For Surgery From Anesthesia Perspective.

## 2017-06-01 NOTE — DISCHARGE INSTRUCTIONS
***Follow up in a month or as needed per Dr. Trujillo***      Tonsillectomy, Post-Op Bleeding  You have had surgery to remove your tonsils. Bleeding at the surgery site can happen after surgery. The doctor can often control it using direct pressure or applying medicine to shrink the blood vessels. If this fails, you will need to return to the operating room for further treatment. Notify your doctor at once or return to this hospital if there are signs of any further bleeding.  Home Care  · Your throat will be sore. Throat pain after surgery improves over the first 3-5 days. It is normal to have more pain at the end of the first week before it finally goes away.  · Soft foods will be easier to swallow.  · Drink plenty of fluids to prevent dehydration. You must continue to drink even though your throat hurts.  · For pain relief, suck on ice cubes or frozen fruit pops, eat ice cream or sherbet, and drink cold liquids. You may also use liquid acetaminophen. Avoid taking ibuprofen or aspirin. These may increase bleeding risk. If your doctor has prescribed pain medication, take this as directed.   · If antibiotics were prescribed, take these as directed until they are gone.  · Do not overheat your home since this dries the air and may irritate throat tissues, especially at night. A cool-mist humidifier in the bedroom may help.  · Avoid exposure to people with colds or the flu during the recovery period. You may be more likely to get ill during this time.  · Smoking irritates the surgery site. If you smoke, this is a good time to stop.  · Avoid any heavy exercise, lifting, or straining for the next 10 days.  Follow-up care  Follow up with your doctor or this facility as advised.  When to see medical advice  Call your doctor right away if any of the following occur:  · Trouble speaking, swallowing, or breathing  · Nausea and vomiting  · Bleeding from the mouth  · Fever over 100.4°F (38.3ºC)  · Increasing pain not controlled  by pain medications  · Signs of dehydration: Very dark urine, less urine, dry mouth, weakness  Date Last Reviewed: 4/20/2015 © 2000-2016 [employer]. [address] [place] [address], [place] [address]. All rights reserved. This information is not intended as a substitute for professional medical care. Always follow your healthcare professional's instructions.              Adult Tonsillectomy  The tonsils are 2 small masses of tissue at the back of the throat. They are part of the bodys immune system. This helps the body fight disease. In some people, the tonsils become infected or enlarged. This can cause severe sore throats, snoring, or other problems. Tonsillectomy is surgery to remove the tonsils. Tonsillectomy may be recommended if you have obstruction causing sleep apnea, or recurring, chronic, or severe infections. This sheet tells you more about this surgery and what to expect.    Preparing for surgery  Prepare as you have been told. Tell your healthcare provider about all medicine you take. This includes over-the-counter drugs. It also includes herbs and other supplements. You may need to stop taking some or all of them before surgery as directed by your healthcare provider. Also, follow any directions youre given for not eating or drinking before surgery.  The day of surgery  The surgery takes about 60 minutes. You will likely go home on the same day.  Before the surgery  Here is what to expect before the surgery begins:  · An IV line is put into a vein in your arm or hand. This line supplies fluids and medicines.  · To keep you free of pain during the surgery, youre given general anesthesia. This medicine puts you into a state like deep sleep through the surgery.  During the surgery  Here is what to expect during the surgery:  · A special device is used to keep the mouth open.  · Other tools are used to remove the tonsils from the back of the throat. The tissue is taken out through the  mouth.  · The device holding the mouth open is then removed.  After the surgery  You will be taken to a recovery room. Healthcare staff will make sure you can drink some liquids. They will also make sure your pain is being managed. When you are ready to leave the hospital, you will need to be driven home by an adult family member or friend.  Recovering at home  It will likely take about 2 weeks to heal from the surgery. During your recovery:  · Expect to have throat pain. You may also feel pain in your ears. This is referred pain from the throat, and is normal. Your post-surgery pain may come and go. It may be worse on the 4th or 5th day after surgery.  · Talk as little as possible, if it is painful.  · Take pain medicine as directed.  · Do not drive while you are on opioid or narcotic pain medicine. Expect to feel sleepy or dizzy while you are taking this medicine.  · Do not use ibuprofen or aspirin for 14 days after surgery unless your healthcare provider says its OK. You may use acetaminophen as directed.  · Use 2 or 3 pillows under your head while resting. This will help keep swelling down.  · Drink lots of chilled liquids. Water, noncitrus juices, and frozen juice bars are good choices.  · Eat cold foods and soft foods, which are easiest to swallow. Try foods like ice cream, gelatin, scrambled eggs, pasta, and mashed potatoes.  · Avoid foods that require a lot of chewing. Also avoid foods that may scratch the throat, like toast or potato chips. Avoid hot, spicy, or acidic foods.  · Avoid strenuous activity for 2 to 3 weeks after surgery.  · Be aware that white patches will form in the throat during healing. These are scabs and are not a sign of infection. The patches will come off in 1 to 2 weeks and may cause bleeding. To minimize bleeding, drink lots of fluids. Gargling with cold water can help.  Call the healthcare provider  Call your healthcare provider if you have any of the following:  · Chest pain or  trouble breathing (call 911)  · Fever of 100.4°F (38°C) or higher, or as directed by your healthcare provider  · Bright red bleeding from the mouth or nose  · Severe pain not relieved by medicine  · Signs of dehydration (dark urine, urinating less often)  · Heavy or persistent bleeding in the throat at any time  · Other signs or symptoms as indicated by your healthcare provider   Follow-up  Schedule a follow-up visit with your healthcare provider as advised. During this visit, the healthcare provider will make sure you are healing well. Ask any questions you have about the surgery or your recovery.  Risks and possible complications   Risks of this surgery include:  · Infection  · Bleeding  · Injury to the lips or teeth  · Painful swallowing during recovery  · The need for a second surgery  · Risks of anesthesia    Date Last Reviewed: 6/11/2015 © 2000-2016 [employer]. [address] [place] [address]. All rights reserved. This information is not intended as a substitute for professional medical care. Always follow your healthcare professional's instructions.      Discharge Instructions: Eating a Soft Diet  You have been prescribed a soft diet (also called gastrointestinal soft diet or bland diet). This reduces the amount of work your digestive tract has to do. It also reduces the chance that your digestive tract will be irritated by the food you eat. A soft diet is prescribed for people with digestive problems. The diet consists of foods that are tender, mildly seasoned, and easy to digest. While on this diet, you should not eat fried or spicy foods, or raw fruits and vegetables. Also avoid alcoholic beverages.  General guidelines  · Eat in a calm, relaxed atmosphere. How you eat may be as important as what you eat. Dont rush while eating. Chew your food slowly and thoroughly, and swallow slowly.  · Eat small frequent meals throughout the day, but dont eat within 2 hours of  bedtime.  · Avoid any foods that cause discomfort.  · Dont use NSAIDs (nonsteroidal anti-inflammatory drugs), such as aspirin, and ibuprofen. Also avoid medicine that contain aspirin. NSAIDs can cause ulcers and delay or prevent ulcer healing.  · Use antacids as needed, but keep in mind that magnesium-containing antacids may cause diarrhea.  Foods to eat  · Cream of wheat and cream of rice  · Cooked white rice  · Mashed potatoes, and boiled potatoes without skin  · Plain pasta and noodles  · Plain white crackers (such as no-salt soda crackers)  · White bread  · Applesauce  · Cooked fruits without skins or seeds  · Mild juices, such as apple and grape  · Bananas  · Cooked or mashed vegetables without stems and seeds  ¨ Carrots  ¨ Summer squash (zucchini, yellow squash)  ¨ Winter squash (acorn, butternut, spaghetti squash)  · Cottage cheese  · Mild hard or soft cheeses  · Custard  · Yogurt without seeds or nuts  · Milk (you may need lactose-free milk)  · Ice cream without seeds or nuts  · Smooth peanut butter  · Eggs  · Fish, turkey, chicken, or other meat that is not tough or stringy  · Tofu  Foods to avoid  · Nuts and seeds  · Snack foods, such as the following:  ¨ Chocolate-containing snacks, candy, pastries, or cakes.  ¨ Potato chips (plain, barbecued, or other flavors)  ¨ Taco chips or nachos  ¨ Corn chips  ¨ Popcorn, popcorn cakes, or rice cakes  ¨ Crackers with nuts, seeds, or spicy seasonings  ¨ French fries  · Fried or greasy foods  · Whole-grain breads, rolls, and crackers  · Breads and rolls with nuts, seeds, or bran  · Bran and granola cereals  · Berries with seeds, such as strawberries, raspberries, and blackberries  · Acidic fruits, such as oranges, grapefruits, vijay, limes, and pineapples  · Raw vegetables  · Mild or hot peppers  · Sauerkraut and pickled vegetables  · Tomatoes or tomato products, such as tomato paste, tomato sauce, and tomato juice  · Barbecue sauce  · Spicy or flavored cheeses, such  as jalapeño and black pepper cheese  · Crunchy peanut butter  · Dried cooked beans, such as hale, kidney, or navy beans  · The following meats:  ¨ Fried or greasy meats  ¨ Processed, spicy meats, such as sausage, castañeda, ham, and lunch meats  ¨ Ribs and other meats with barbecue sauce  ¨ Tough or stringy meats, such as corned beef or beef jerky  Fluids to avoid  · Alcoholic beverages  · Coffee and regular teas  · Mj and other drinks with caffeine  · Cranberry, orange, pineapple, and grapefruit juice  · Lemonade  · Vegetable juice  · Whole milk, if you are lactose intolerant  Follow-up  Make a follow-up appointment with a dietitian as directed by our staff.  Date Last Reviewed: 6/21/2015 © 2000-2016 [employer]. [address] [place] [address]. All rights reserved. This information is not intended as a substitute for professional medical care. Always follow your healthcare professional's instructions.      General Information:  1.  Do not drink alcoholic beverages including beer for 24 hours or as long as you are on pain medication..  2.  Do not drive a motor vehicle, operate machinery or power tools, or signs legal papers for 24 hours or as long as you are on pain medication.   3.  You may experience light-headedness, dizziness, and sleepiness following surgery. Please do not stay alone. A responsible adult should be with you for this 24 hour period.  4.  Go home and rest.  5. Progress slowly to a normal diet unless instructed.  Otherwise, begin with liquids such as soft drinks, then soup and crackers working up to solid foods. Drink plenty of nonalcoholic fluids.  6.  Certain anesthetics and pain medications produce nausea and vomiting in certain       individuals. If nausea becomes a problem at home, call you doctor.  7. A nurse will be calling you sometime after surgery. Do not be alarmed. This is our way of finding out how you are doing.  8. Several times every hour while you are  awake, take 2-3 deep breaths and cough. If you had stomach surgery hold a pillow or rolled towel firmly against your stomach before you cough. This will help with any pain the cough might cause.  9. Several times every hour while you are awake, pump and flex your feet 5-6 times and do foot circles. This will help prevent blood clots.  10.Call your doctor for severe pain, bleeding, fever, or signs or symptoms of infection (pain, swelling, redness, foul odor, drainage).  11.You can contact your doctor anytime by [phone] for the [facility] (at [facility]) or [phone] for the '[facility] on [facility].   [email or web address] is another way to contact your doctor if you are an active participant online with [facility].

## 2017-06-01 NOTE — TRANSFER OF CARE
"Anesthesia Transfer of Care Note    Patient: Elyse Martinez    Procedure(s) Performed: Procedure(s) (LRB):  TONSILLECTOMY (Bilateral)    Patient location: PACU    Anesthesia Type: general    Transport from OR: Transported from OR on room air with adequate spontaneous ventilation    Post pain: adequate analgesia    Post assessment: no apparent anesthetic complications    Post vital signs: stable    Level of consciousness: responds to stimulation    Nausea/Vomiting: no nausea/vomiting    Complications: none    Transfer of care protocol was followed      Last vitals:   Visit Vitals  /73 (Patient Position: Sitting, BP Method: Automatic)   Pulse 78   Temp 36.8 °C (98.3 °F) (Temporal)   Resp 16   Ht 5' 6" (1.676 m)   Wt 63.2 kg (139 lb 5.3 oz)   SpO2 100%   Breastfeeding? No   BMI 22.49 kg/m²     "

## 2017-06-01 NOTE — H&P
HPI:  Elyse is a 21 y.o. female who I was asked to see in consultation for evaluation of the following issue(s):     Elyse is a very pleasant 21-year-old here to see me for the first time.  She works in the emergency room here in our hospital.  She reports that for the past few years, she has had chronic pain in the tonsillar area.  She has frequent stones extrude from the tonsils.  The left side she, she has actually had some significant bleeding when the stones have spontaneously extruded.  She has been on numerous rounds of antibiotics through an outside physician without significant improvement.  She feels like it is almost always the left side that is most bothersome.  Pain is typically mild to moderate crampy in nature but can sometimes become quite severe.     Review of Systems:  Negative unless checked off.  Gen:              []fever                                                          []fatigue  HENT:                       []nosebleeds                                      []dental problem   Eyes:             []photophobia                                    []visual disturbance  Resp:             []chest tightness                      []wheezing  Card:             []chest pain                                        []leg swelling  GI:                 []abdominal pain                     []blood in stool  :                []dysuria                                 []hematuria  Musc:             []joint swelling                                    []gait problem  Skin:              []color change                                   []pallor  Neuro:                       []seizures                                           []numbness  Hem:              []bruise/bleed easily  Psych:                       []hallucinations                                  []behavioral problems  Allergy/Imm: has No Known Allergies.     Her meds, allergies, medical, surgical, social & family histories were reviewed & updated:  -  "    She has a current medication list which includes the following prescription(s): levonorgestrel-ethinyl estradiol, celecoxib, and hydrocodone-apap 2.5-108 mg/5 ml.  -     She  has no past medical history on file.   -     She  does not have a problem list on file.   -     She  has no past surgical history on file.  -     She  reports that she has never smoked. She does not have any smokeless tobacco history on file.  -     Her family history is not on file.  -     She has No Known Allergies.     Objective:      Physical Exam:  Vitals:  BP (!) 136/95  Pulse 74  Temp 98.1 °F (36.7 °C) (Tympanic)   Resp 18  Ht 5' 6" (1.676 m)  Wt 64.1 kg (141 lb 5 oz)  BMI 22.81 kg/m2  General appearance:  Well developed, well nourished     Eyes:  Extraocular motions intact, PERRL     Communication:  no hoarseness, no dysphonia     Ears:  Otoscopy of external auditory canals and tympanic membranes was normal, clinical speech reception thresholds grossly intact, no mass/lesion of auricle.  Nose:  No masses/lesions of external nose, nasal mucosa, septum, and turbinates were within normal limits.  Mouth: Left tonsil is significantly larger than the right.  There is moderate inflammation as well as some infectious debris on the left side.        Cardiovascular:  No pedal edema; Radial Pulses +2      Neck & Lymphatics:  No cervical lymphadenopathy, no neck mass/crepitus/ asymmetry, trachea is midline, no thyroid enlargement/tenderness/mass.     Psych: Oriented x3,  Alert with normal mood and affect.                      Respiration/Chest:  Symmetric expansion during respiration, normal respiratory effort.     Skin:  Warm and intact. No ulcerations of face, scalp, neck.     Assessment & Plan:   Elyse was seen today for other.     Diagnoses and all orders for this visit:     Chronic tonsillitis     Tonsil asymmetry     Other orders  -     hydrocodone-acetaminophen (HYCET) solution 7.5-325 mg/15mL; Take 30 mLs by mouth every 6 (six) " hours as needed for Pain.  -     celecoxib (CELEBREX) 200 MG capsule; Take 1 capsule (200 mg total) by mouth 2 (two) times daily.        Recommend tonsillectomy for asymmetry and chronic tonsillitis.        The risks and benefits of surgery were discussed at length including, but not limited to, respiratory distress, hemorrhage, regurgitation of food/liquids into the nasopharynx, voice changes, dehydration and pain.  The patient had the opportunity to ask questions to their satisfaction.             We discussed her medical conditions, treatments and plan.  Elyse should return to clinic if any issues arise (symptoms worsen or persist), otherwise we will see her back in the clinic after surgery.

## 2018-07-13 ENCOUNTER — PATIENT OUTREACH (OUTPATIENT)
Dept: ADMINISTRATIVE | Facility: HOSPITAL | Age: 23
End: 2018-07-13

## 2018-07-16 ENCOUNTER — OFFICE VISIT (OUTPATIENT)
Dept: INTERNAL MEDICINE | Facility: CLINIC | Age: 23
End: 2018-07-16
Payer: COMMERCIAL

## 2018-07-16 VITALS
HEART RATE: 60 BPM | TEMPERATURE: 99 F | HEIGHT: 66 IN | OXYGEN SATURATION: 98 % | WEIGHT: 131.81 LBS | SYSTOLIC BLOOD PRESSURE: 112 MMHG | BODY MASS INDEX: 21.18 KG/M2 | DIASTOLIC BLOOD PRESSURE: 86 MMHG

## 2018-07-16 DIAGNOSIS — Z00.00 ANNUAL PHYSICAL EXAM: Primary | ICD-10-CM

## 2018-07-16 PROCEDURE — 99385 PREV VISIT NEW AGE 18-39: CPT | Mod: S$GLB,,, | Performed by: INTERNAL MEDICINE

## 2018-07-16 PROCEDURE — 99999 PR PBB SHADOW E&M-EST. PATIENT-LVL III: CPT | Mod: PBBFAC,,, | Performed by: INTERNAL MEDICINE

## 2018-07-17 NOTE — PROGRESS NOTES
Subjective:       Patient ID: Elyse Martinez is a 23 y.o. female.    Chief Complaint: Establish Care and Annual Exam    23 y.o. White female     Patient presents with:  Establish Care  Annual Exam    HPI: Here today to establish care and for a physical. She is without complaints.     Lipid Panel due on 1995  HPV Vaccines(1 of 3 - Female 3 Dose Series) due on 04/07/2006  TETANUS VACCINE due on 04/07/2013  Pap Smear due on 04/07/2016    History reviewed. No pertinent past medical history.    Past Surgical History:  TONSILLECTOMY    Review of patient's family history indicates:  Problem: Cancer      Relation: Neg Hx       Age of Onset: (Not Specified)   Problem: Diabetes      Relation: Neg Hx       Age of Onset: (Not Specified)   Problem: Heart disease      Relation: Neg Hx       Age of Onset: (Not Specified)   Problem: Hypertension      Relation: Neg Hx       Age of Onset: (Not Specified)   Problem: Kidney disease      Relation: Neg Hx       Age of Onset: (Not Specified)   Problem: Stroke      Relation: Neg Hx       Age of Onset: (Not Specified)     Current Outpatient Prescriptions on File Prior to Visit:  levonorgestrel-ethinyl estradiol (AVIANE,ALESSE,LESSINA) 0.1-20 mg-mcg per tablet, Take 1 tablet by mouth once daily., Disp: , Rfl:   naproxen sodium (ANAPROX) 220 MG tablet, Take 375 mg by mouth 2 (two) times daily as needed., Disp: , Rfl:     Allergies:   Review of patient's allergies indicates:  No Known Allergies              Review of Systems   Constitutional: Negative for fatigue, fever and unexpected weight change.   HENT: Negative for trouble swallowing and voice change.    Eyes: Negative for visual disturbance.   Respiratory: Negative for cough and shortness of breath.    Cardiovascular: Negative for chest pain, palpitations and leg swelling.   Gastrointestinal: Negative for abdominal pain, blood in stool, constipation and diarrhea.   Genitourinary: Positive for menstrual problem (pain with menses  requiring naproxen). Negative for difficulty urinating and dysuria.   Musculoskeletal: Negative for arthralgias, back pain, gait problem and joint swelling.   Skin: Negative for rash.   Neurological: Negative for dizziness and headaches.   Hematological: Does not bruise/bleed easily.   Psychiatric/Behavioral: Negative for dysphoric mood and sleep disturbance. The patient is not nervous/anxious.        Objective:      Physical Exam   Constitutional: She is oriented to person, place, and time. She appears well-developed and well-nourished. No distress.   Eyes: No scleral icterus.   Neck: No tracheal deviation present. No thyromegaly present.   Cardiovascular: Normal rate, regular rhythm and normal heart sounds.    Pulmonary/Chest: Effort normal and breath sounds normal. No respiratory distress.   Abdominal: Soft. Bowel sounds are normal.   Musculoskeletal: She exhibits no edema.   Lymphadenopathy:     She has no cervical adenopathy.   Neurological: She is alert and oriented to person, place, and time.   Skin: Skin is warm and dry.   Psychiatric: She has a normal mood and affect.   Vitals reviewed.      Assessment:       1. Annual physical exam        Plan:       Elyse was seen today for establish care and annual exam.    Diagnoses and all orders for this visit:    Annual physical exam  -     Counseled regarding age appropriate screenings and immunizations   -     CBC auto differential; Future  -     Comprehensive metabolic panel; Future  -     TSH; Future  -     Lipid panel; Future    Schedule labs.    RTC annually and as needed.

## 2018-07-18 ENCOUNTER — LAB VISIT (OUTPATIENT)
Dept: LAB | Facility: HOSPITAL | Age: 23
End: 2018-07-18
Payer: COMMERCIAL

## 2018-07-18 DIAGNOSIS — Z00.00 ANNUAL PHYSICAL EXAM: ICD-10-CM

## 2018-07-18 LAB
ALBUMIN SERPL BCP-MCNC: 4.3 G/DL
ALP SERPL-CCNC: 34 U/L
ALT SERPL W/O P-5'-P-CCNC: 13 U/L
ANION GAP SERPL CALC-SCNC: 9 MMOL/L
AST SERPL-CCNC: 17 U/L
BASOPHILS # BLD AUTO: 0.06 K/UL
BASOPHILS NFR BLD: 1 %
BILIRUB SERPL-MCNC: 0.4 MG/DL
BUN SERPL-MCNC: 11 MG/DL
CALCIUM SERPL-MCNC: 9.8 MG/DL
CHLORIDE SERPL-SCNC: 105 MMOL/L
CHOLEST SERPL-MCNC: 136 MG/DL
CHOLEST/HDLC SERPL: 2.6 {RATIO}
CO2 SERPL-SCNC: 24 MMOL/L
CREAT SERPL-MCNC: 1 MG/DL
DIFFERENTIAL METHOD: ABNORMAL
EOSINOPHIL # BLD AUTO: 0.1 K/UL
EOSINOPHIL NFR BLD: 2.1 %
ERYTHROCYTE [DISTWIDTH] IN BLOOD BY AUTOMATED COUNT: 12 %
EST. GFR  (AFRICAN AMERICAN): >60 ML/MIN/1.73 M^2
EST. GFR  (NON AFRICAN AMERICAN): >60 ML/MIN/1.73 M^2
GLUCOSE SERPL-MCNC: 101 MG/DL
HCT VFR BLD AUTO: 33.7 %
HDLC SERPL-MCNC: 53 MG/DL
HDLC SERPL: 39 %
HGB BLD-MCNC: 11.2 G/DL
IMM GRANULOCYTES # BLD AUTO: 0.01 K/UL
IMM GRANULOCYTES NFR BLD AUTO: 0.2 %
LDLC SERPL CALC-MCNC: 73.6 MG/DL
LYMPHOCYTES # BLD AUTO: 1.8 K/UL
LYMPHOCYTES NFR BLD: 30.4 %
MCH RBC QN AUTO: 32.4 PG
MCHC RBC AUTO-ENTMCNC: 33.2 G/DL
MCV RBC AUTO: 97 FL
MONOCYTES # BLD AUTO: 0.4 K/UL
MONOCYTES NFR BLD: 6 %
NEUTROPHILS # BLD AUTO: 3.5 K/UL
NEUTROPHILS NFR BLD: 60.3 %
NONHDLC SERPL-MCNC: 83 MG/DL
NRBC BLD-RTO: 0 /100 WBC
PLATELET # BLD AUTO: 263 K/UL
PMV BLD AUTO: 11.5 FL
POTASSIUM SERPL-SCNC: 3.9 MMOL/L
PROT SERPL-MCNC: 7 G/DL
RBC # BLD AUTO: 3.46 M/UL
SODIUM SERPL-SCNC: 138 MMOL/L
TRIGL SERPL-MCNC: 47 MG/DL
TSH SERPL DL<=0.005 MIU/L-ACNC: 1.43 UIU/ML
WBC # BLD AUTO: 5.79 K/UL

## 2018-07-18 PROCEDURE — 36415 COLL VENOUS BLD VENIPUNCTURE: CPT

## 2018-07-18 PROCEDURE — 80053 COMPREHEN METABOLIC PANEL: CPT

## 2018-07-18 PROCEDURE — 84443 ASSAY THYROID STIM HORMONE: CPT

## 2018-07-18 PROCEDURE — 85025 COMPLETE CBC W/AUTO DIFF WBC: CPT

## 2018-07-18 PROCEDURE — 80061 LIPID PANEL: CPT

## 2018-07-25 ENCOUNTER — OFFICE VISIT (OUTPATIENT)
Dept: OBSTETRICS AND GYNECOLOGY | Facility: CLINIC | Age: 23
End: 2018-07-25
Payer: COMMERCIAL

## 2018-07-25 VITALS
BODY MASS INDEX: 22 KG/M2 | WEIGHT: 136.88 LBS | SYSTOLIC BLOOD PRESSURE: 126 MMHG | DIASTOLIC BLOOD PRESSURE: 72 MMHG | HEIGHT: 66 IN

## 2018-07-25 DIAGNOSIS — Z01.419 PAP SMEAR, AS PART OF ROUTINE GYNECOLOGICAL EXAMINATION: ICD-10-CM

## 2018-07-25 DIAGNOSIS — Z30.41 ENCOUNTER FOR SURVEILLANCE OF CONTRACEPTIVE PILLS: ICD-10-CM

## 2018-07-25 DIAGNOSIS — Z11.8 SCREENING FOR CHLAMYDIAL DISEASE: ICD-10-CM

## 2018-07-25 DIAGNOSIS — Z01.419 WELL WOMAN EXAM WITH ROUTINE GYNECOLOGICAL EXAM: Primary | ICD-10-CM

## 2018-07-25 PROCEDURE — 88175 CYTOPATH C/V AUTO FLUID REDO: CPT

## 2018-07-25 PROCEDURE — 99999 PR PBB SHADOW E&M-EST. PATIENT-LVL III: CPT | Mod: PBBFAC,,, | Performed by: OBSTETRICS & GYNECOLOGY

## 2018-07-25 PROCEDURE — 87491 CHLMYD TRACH DNA AMP PROBE: CPT

## 2018-07-25 PROCEDURE — 99385 PREV VISIT NEW AGE 18-39: CPT | Mod: S$GLB,,, | Performed by: OBSTETRICS & GYNECOLOGY

## 2018-07-25 RX ORDER — LEVONORGESTREL AND ETHINYL ESTRADIOL 0.1-0.02MG
1 KIT ORAL DAILY
Qty: 28 TABLET | Refills: 11 | Status: SHIPPED | OUTPATIENT
Start: 2018-07-25 | End: 2019-07-05 | Stop reason: SDUPTHER

## 2018-07-25 NOTE — PATIENT INSTRUCTIONS
Breast Health: Breast Self-Awareness  What is breast self-awareness?  Breast self-awareness is knowing how your breasts normally look and feel. Your breasts change as you go through different stages of your life. So its important to learn what is normal for your breasts. Breast self-awareness helps you notice any changes in your breasts right away. Report any changes to your healthcare provider.  Why is breast self-awareness important?  Many experts now say that women should focus on breast self-awareness instead of doing a breast self-examination (BSE). These experts include the American Cancer Society, the U.S. Preventive Services Task Force, and the American Congress of Obstetricians and Gynecologists. Some experts even advise not teaching women to do a BSE. Thats because research hasnt shown a clear benefit to doing BSEs.  Breast self-awareness is different than a BSE. Breast self-awareness isnt about following a certain method and schedule. Its about knowing what's normal for your breasts. That way you can notice even small changes right away. If you see any changes, report them to your healthcare provider.  Changes to look for  Call your healthcare provider if you find any changes in your breasts that concern you. These changes may include:  · A lump  · Nipple discharge other than breast milk, especially a bloody discharge  · Swelling  · A change in size or shape  · Skin irritation, such as redness, thickening, or dimpling of the skin  · Swollen lymph nodes in the armpit  · Nipple problems, such as pain or redness  If you find a lump  Contact your provider if you find lumpiness in one breast, feel something different in the tissue, or feel a definite lump. Sometimes lumpiness may be due to menstrual changes. But there may be reason for concern.  Your provider may want to see you right away if you have:  · Nipple discharge that is bloody  · Skin changes on your breast, such as dimpling or puckering  Its  normal to be upset if you find a lump. But its important to contact your provider right away. Remember that most breast lumps are benign. This means they are not cancer.  Date Last Reviewed: 8/10/2015  © 5920-2715 [employer]. [address] [place] [address], [place] [address]. All rights reserved. This information is not intended as a substitute for professional medical care. Always follow your healthcare professional's instructions.

## 2018-07-25 NOTE — PROGRESS NOTES
Subjective:       Patient ID: Elyse Martinez is a 23 y.o. female.    Chief Complaint:  Annual Exam      History of Present Illness  HPI  Presents for well-woman exam.  No gyn complaints.  Needs refill of OCP's.  Has a hx of recurrent ovarian cysts.  Is mutually monogamous with a male partner.  Agrees to GC/CT screening today.  States her last pap was 1 year ago in [place].  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    GYN & OB History  Patient's last menstrual period was 2018 (approximate).   Date of Last Pap: No result found    OB History    Para Term  AB Living   0 0 0 0 0 0   SAB TAB Ectopic Multiple Live Births   0 0 0 0 0             Review of Systems  Review of Systems   Constitutional: Negative for activity change, fatigue, fever and unexpected weight change.   Gastrointestinal: Negative for abdominal pain, bloating, constipation, diarrhea, nausea and vomiting.   Endocrine: Negative for hair loss and hot flashes.   Genitourinary: Negative for dyspareunia, dysuria, frequency, genital sores, hematuria, menorrhagia, menstrual problem, pelvic pain, urgency, vaginal bleeding, vaginal discharge, vaginal pain, dysmenorrhea, postcoital bleeding and vaginal odor.   Skin:  Negative for rash and hair changes.   Hematological: Negative for adenopathy.   Breast: Negative for breast mass, breast pain, nipple discharge and skin changes          Objective:    Physical Exam:   Constitutional: She is oriented to person, place, and time. She appears well-developed and well-nourished. No distress.      Neck: Neck supple. No thyromegaly present.     Pulmonary/Chest: Right breast exhibits no inverted nipple, no mass, no nipple discharge, no skin change, no tenderness, no bleeding and no swelling. Left breast exhibits no inverted nipple, no mass, no nipple discharge, no skin change, no tenderness, no bleeding and no swelling.  Breasts are symmetrical.        Abdominal: Soft. She exhibits no distension and no mass. There is no tenderness. There is no rebound and no guarding.     Genitourinary: Pelvic exam was performed with patient supine. There is no rash, tenderness, lesion or injury on the right labia. There is no rash, tenderness, lesion or injury on the left labia. Uterus is not deviated, not enlarged, not fixed, not tender, not hosting fibroids and not experiencing uterine prolapse. Cervix is normal. Right adnexum displays no mass, no tenderness and no fullness. Left adnexum displays no mass, no tenderness and no fullness. No erythema, tenderness, bleeding, rectocele or cystocele in the vagina. No foreign body in the vagina. No signs of injury around the vagina. No vaginal discharge found. Cervix exhibits no motion tenderness, no discharge and no friability.        Uterus Size: 6 cm      Lymphadenopathy:        Right: No inguinal adenopathy present.        Left: No inguinal adenopathy present.    Neurological: She is alert and oriented to person, place, and time.     Psychiatric: She has a normal mood and affect.          Assessment:        1. Well woman exam with routine gynecological exam    2. Pap smear, as part of routine gynecological examination    3. Encounter for surveillance of contraceptive pills    4. Screening for chlamydial disease                Plan:      Elyse was seen today for annual exam.    Diagnoses and all orders for this visit:    Well woman exam with routine gynecological exam    Pap smear, as part of routine gynecological examination  -     Liquid-based pap smear, screening    Encounter for surveillance of contraceptive pills  -     levonorgestrel-ethinyl estradiol (AVIANE,ALESSE,LESSINA) 0.1-20 mg-mcg per tablet; Take 1 tablet by mouth once daily.    Screening for chlamydial disease  -     C. trachomatis/N. gonorrhoeae by AMP DNA Cervix    Reviewed updated recommendations for pap smears (every 3 years) in  low risk patients.   Recommend annual pelvic exams.  Reviewed recommendations for annual CBE.  RTC 1 year or sooner prn.

## 2018-07-26 LAB
C TRACH DNA SPEC QL NAA+PROBE: NOT DETECTED
N GONORRHOEA DNA SPEC QL NAA+PROBE: NOT DETECTED

## 2019-07-05 DIAGNOSIS — Z30.41 ENCOUNTER FOR SURVEILLANCE OF CONTRACEPTIVE PILLS: ICD-10-CM

## 2019-07-05 RX ORDER — LEVONORGESTREL AND ETHINYL ESTRADIOL 0.1-0.02MG
1 KIT ORAL DAILY
Qty: 28 TABLET | Refills: 1 | Status: SHIPPED | OUTPATIENT
Start: 2019-07-05 | End: 2019-07-29 | Stop reason: SDUPTHER

## 2019-07-05 NOTE — TELEPHONE ENCOUNTER
Pt's last annual 7/2018, pt scheduled for annual 8/7/19. Pt requesting refill on ocp. Medication pended. Pharmacy up to date. Please advise.   Dr. Murcia is out of clinic until 7/15, please advise if a one month supply can be called in.

## 2019-07-05 NOTE — TELEPHONE ENCOUNTER
----- Message from Larisa Bowman sent at 7/5/2019 10:20 AM CDT -----  Contact: Pt  .Type:  RX Refill Request    Who Called: PT  Refill or New Rx:REFILL  RX Name and Strength:levonorgestrel-ethinyl estradiol (AVIANE,ALESSE,LESSINA) 0.1-20 mg-mcg per tablet  How is the patient currently taking it? (ex. 1XDay):1  Is this a 30 day or 90 day RX:90  Preferred Pharmacy with phone number:.  [facility] [address] [place] [id number]  Phone: [phone] Fax: [phone]  Local or Mail Order:LOCAL  Ordering Provider:EMBER  Would the patient rather a call back or a response via [facility]? CALL BACK  Best Call Back Number:.[phone]  Additional Information:

## 2019-07-08 RX ORDER — LEVONORGESTREL AND ETHINYL ESTRADIOL 0.1-0.02MG
KIT ORAL
Qty: 28 TABLET | Refills: 11 | Status: SHIPPED | OUTPATIENT
Start: 2019-07-08 | End: 2019-08-07

## 2019-07-29 DIAGNOSIS — Z30.41 ENCOUNTER FOR SURVEILLANCE OF CONTRACEPTIVE PILLS: ICD-10-CM

## 2019-07-29 NOTE — TELEPHONE ENCOUNTER
----- Message from Cayden Patino sent at 7/29/2019  4:08 PM CDT -----  Contact: PT  Type:  Sooner Apoointment Request    Caller is requesting a sooner appointment.  Caller declined first available appointment listed below.  Caller will not accept being placed on the waitlist and is requesting a message be sent to doctor.  Name of Caller: PT   When is the first available appointment? 08/07/19  Symptoms: well woman exam/birth control   Would the patient rather a call back or a response via [facility]? Call back   Best Call Back Number: [phone] (home)   Additional Information: n/a

## 2019-07-29 NOTE — TELEPHONE ENCOUNTER
Pt is on last week of  BC  Would like refill called in until her appointment on 8/7/19 with you.   Medication pending. Pharm up to date.   Please advise. Thank you.

## 2019-07-30 ENCOUNTER — TELEPHONE (OUTPATIENT)
Dept: OBSTETRICS AND GYNECOLOGY | Facility: CLINIC | Age: 24
End: 2019-07-30

## 2019-07-30 RX ORDER — LEVONORGESTREL AND ETHINYL ESTRADIOL 0.1-0.02MG
1 KIT ORAL DAILY
Qty: 28 TABLET | Refills: 1 | Status: SHIPPED | OUTPATIENT
Start: 2019-07-30 | End: 2019-08-07

## 2019-07-30 NOTE — TELEPHONE ENCOUNTER
----- Message from Toy Rai sent at 7/30/2019 11:24 AM CDT -----  ..Type:  Patient Returning Call    Who Called: pt   Who Left Message for Patient:  Does the patient know what this is regarding?: birth control  Would the patient rather a call back or a response via [facility]? Call back   Best Call Back Number:[phone]  Additional Information: pt is returning a call form nurse to follow up on Birth Control

## 2019-07-30 NOTE — TELEPHONE ENCOUNTER
Attempted to contact patient to notify pt of prescription sent. No answer, unable to leave voice mail. Will try again later.

## 2019-07-30 NOTE — TELEPHONE ENCOUNTER
Spoke to patient and she wanted to know if a refill for her birth control was sent in. Let her know that it was sent in this morning to her [facility] pharmacy in [place]. Patient verbalized understanding and is appreciative of call.

## 2019-08-07 ENCOUNTER — OFFICE VISIT (OUTPATIENT)
Dept: OBSTETRICS AND GYNECOLOGY | Facility: CLINIC | Age: 24
End: 2019-08-07
Payer: COMMERCIAL

## 2019-08-07 DIAGNOSIS — Z23 NEED FOR HPV VACCINATION: ICD-10-CM

## 2019-08-07 DIAGNOSIS — Z30.41 ENCOUNTER FOR SURVEILLANCE OF CONTRACEPTIVE PILLS: ICD-10-CM

## 2019-08-07 DIAGNOSIS — Z11.3 SCREENING EXAMINATION FOR STD (SEXUALLY TRANSMITTED DISEASE): ICD-10-CM

## 2019-08-07 DIAGNOSIS — Z01.419 WELL WOMAN EXAM WITH ROUTINE GYNECOLOGICAL EXAM: Primary | ICD-10-CM

## 2019-08-07 PROCEDURE — 90651 HPV VACCINE 9-VALENT 3 DOSE IM: ICD-10-PCS | Mod: S$GLB,,, | Performed by: OBSTETRICS & GYNECOLOGY

## 2019-08-07 PROCEDURE — 99395 PR PREVENTIVE VISIT,EST,18-39: ICD-10-PCS | Mod: 25,S$GLB,, | Performed by: OBSTETRICS & GYNECOLOGY

## 2019-08-07 PROCEDURE — 90471 HPV VACCINE 9-VALENT 3 DOSE IM: ICD-10-PCS | Mod: S$GLB,,, | Performed by: OBSTETRICS & GYNECOLOGY

## 2019-08-07 PROCEDURE — 99395 PREV VISIT EST AGE 18-39: CPT | Mod: 25,S$GLB,, | Performed by: OBSTETRICS & GYNECOLOGY

## 2019-08-07 PROCEDURE — 90471 IMMUNIZATION ADMIN: CPT | Mod: S$GLB,,, | Performed by: OBSTETRICS & GYNECOLOGY

## 2019-08-07 PROCEDURE — 99999 PR PBB SHADOW E&M-EST. PATIENT-LVL II: CPT | Mod: PBBFAC,,, | Performed by: OBSTETRICS & GYNECOLOGY

## 2019-08-07 PROCEDURE — 90651 9VHPV VACCINE 2/3 DOSE IM: CPT | Mod: S$GLB,,, | Performed by: OBSTETRICS & GYNECOLOGY

## 2019-08-07 PROCEDURE — 99999 PR PBB SHADOW E&M-EST. PATIENT-LVL II: ICD-10-PCS | Mod: PBBFAC,,, | Performed by: OBSTETRICS & GYNECOLOGY

## 2019-08-07 PROCEDURE — 87491 CHLMYD TRACH DNA AMP PROBE: CPT

## 2019-08-07 RX ORDER — LEVONORGESTREL AND ETHINYL ESTRADIOL 0.1-0.02MG
1 KIT ORAL DAILY
Qty: 90 TABLET | Refills: 3 | Status: SHIPPED | OUTPATIENT
Start: 2019-08-07 | End: 2020-08-02 | Stop reason: SDUPTHER

## 2019-08-07 RX ORDER — DEXTROAMPHETAMINE SACCHARATE, AMPHETAMINE ASPARTATE MONOHYDRATE, DEXTROAMPHETAMINE SULFATE AND AMPHETAMINE SULFATE 5; 5; 5; 5 MG/1; MG/1; MG/1; MG/1
CAPSULE, EXTENDED RELEASE ORAL
Refills: 0 | COMMUNITY
Start: 2019-07-05

## 2019-08-07 NOTE — PATIENT INSTRUCTIONS
Human Papillomavirus Quadrivalent Vaccine suspension for injection  What is this medicine?  HUMAN PAPILLOMAVIRUS VACCINE (HYOO muhn pap uh LEILANI [facility]) is a vaccine. It is used to prevent infections of four types of the human papillomavirus. In women, the vaccine may lower your risk of getting cervical, vaginal, vulvar, or anal cancer and genital warts. In men, the vaccine may lower your risk of getting genital warts and anal cancer. You cannot get these diseases from the vaccine. This vaccine does not treat these diseases.  How should I use this medicine?  This vaccine is for injection in a muscle on your upper arm or thigh. It is given by a health care professional. You will be observed for 15 minutes after each dose. Sometimes, fainting happens after the vaccine is given. You may be asked to sit or lie down during the 15 minutes. Three doses are given. The second dose is given 2 months after the first dose. The last dose is given 4 months after the second dose.  A copy of a Vaccine Information Statement will be given before each vaccination. Read this sheet carefully each time. The sheet may change frequently.  Talk to your pediatrician regarding the use of this medicine in children. While this drug may be prescribed for children as young as 9 years of age for selected conditions, precautions do apply.  What side effects may I notice from receiving this medicine?  Side effects that you should report to your doctor or health care professional as soon as possible:  · allergic reactions like skin rash, itching or hives, swelling of the face, lips, or tongue  · breathing problems  · feeling faint or lightheaded, falls  Side effects that usually do not require medical attention (report to your doctor or health care professional if they continue or are bothersome):  · cough  · dizziness  · fever  · headache  · nausea  · redness, warmth, swelling, pain, or itching at site where injected  What may  interact with this medicine?  · other vaccines  What if I miss a dose?  All 3 doses of the vaccine should be given within 6 months. Remember to keep appointments for follow-up doses. Your health care provider will tell you when to return for the next vaccine. Ask your health care professional for advice if you are unable to keep an appointment or miss a scheduled dose.  Where should I keep my medicine?  This drug is given in a hospital or clinic and will not be stored at home.  What should I tell my health care provider before I take this medicine?  They need to know if you have any of these conditions:  · fever or infection  · hemophilia  · HIV infection or AIDS  · immune system problems  · low platelet count  · an unusual reaction to Human Papillomavirus Vaccine, yeast, other medicines, foods, dyes, or preservatives  · pregnant or trying to get pregnant  · breast-feeding  What should I watch for while using this medicine?  This vaccine may not fully protect everyone. Continue to have regular pelvic exams and cervical or anal cancer screenings as directed by your doctor.  The Human Papillomavirus is a sexually transmitted disease. It can be passed by any kind of sexual activity that involves genital contact. The vaccine works best when given before you have any contact with the virus. Many people who have the virus do not have any signs or symptoms.  Tell your doctor or health care professional if you have any reaction or unusual symptom after getting the vaccine.  NOTE:This sheet is a summary. It may not cover all possible information. If you have questions about this medicine, talk to your doctor, pharmacist, or health care provider. Copyright© 2017 [employer]

## 2019-08-07 NOTE — PROGRESS NOTES
Gardasil 0.5 ml given IM left deltoid. Order per Dr. KALLI Murcia. Advised pt. To remain 15 minutes after injection. No complications. Next injection scheduled for 2 month return on 10/8/19. She will need an appt. For nurse visit and 3rd injection 4 months after this appt.

## 2019-08-07 NOTE — PROGRESS NOTES
Subjective:       Patient ID: Elyse Martinez is a 24 y.o. female.    Chief Complaint:  Well Woman      History of Present Illness  HPI  Presents for well-woman exam.  No gyn complaints.  Last pap 2018 negative.  Mutually monogamous with a male partner.  Agrees to GC/CT screening today.  On OCP's for contraception and a hx of ovarian cysts.  Needs refill.  Has never received the HPV vaccine series.  Would like to start it today.    GYN & OB History  No LMP recorded.   Date of Last Pap: 2018    OB History    Para Term  AB Living   0 0 0 0 0 0   SAB TAB Ectopic Multiple Live Births   0 0 0 0 0       Review of Systems  Review of Systems   Constitutional: Negative for activity change, fatigue, fever and unexpected weight change.   Gastrointestinal: Negative for abdominal pain, bloating, constipation, diarrhea, nausea and vomiting.   Endocrine: Negative for hair loss and hot flashes.   Genitourinary: Negative for dysmenorrhea, dyspareunia, dysuria, frequency, genital sores, hematuria, menorrhagia, menstrual problem, pelvic pain, urgency, vaginal bleeding, vaginal discharge, vaginal pain, postcoital bleeding and vaginal odor.   Integumentary:  Negative for rash, hair changes, breast mass, nipple discharge and breast skin changes.   Hematological: Negative for adenopathy.   Breast: Negative for mass, mastodynia, nipple discharge and skin changes          Objective:    Physical Exam:   Constitutional: She is oriented to person, place, and time. She appears well-developed and well-nourished. No distress.      Neck: Neck supple. No thyromegaly present.     Pulmonary/Chest: Right breast exhibits no inverted nipple, no mass, no nipple discharge, no skin change, no tenderness, no bleeding and no swelling. Left breast exhibits no inverted nipple, no mass, no nipple discharge, no skin change, no tenderness, no bleeding and no swelling. Breasts are symmetrical.        Abdominal: Soft. She exhibits no  distension and no mass. There is no tenderness. There is no rebound and no guarding.     Genitourinary: Pelvic exam was performed with patient supine. There is no rash, tenderness, lesion or injury on the right labia. There is no rash, tenderness, lesion or injury on the left labia. Uterus is not deviated, not enlarged, not fixed, not tender, not hosting fibroids and not experiencing uterine prolapse. Cervix is normal. Right adnexum displays no mass, no tenderness and no fullness. Left adnexum displays no mass, no tenderness and no fullness. No erythema, tenderness, bleeding, rectocele or cystocele in the vagina. No foreign body in the vagina. No signs of injury around the vagina. No vaginal discharge found. Cervix exhibits no motion tenderness, no discharge and no friability.        Uterus Size: 6 cm      Lymphadenopathy:        Right: No inguinal adenopathy present.        Left: No inguinal adenopathy present.    Neurological: She is alert and oriented to person, place, and time.     Psychiatric: She has a normal mood and affect.          Assessment:        1. Well woman exam with routine gynecological exam    2. Encounter for surveillance of contraceptive pills    3. Need for HPV vaccination    4. Screening examination for STD (sexually transmitted disease)                Plan:      Elyse was seen today for well woman.    Diagnoses and all orders for this visit:    Well woman exam with routine gynecological exam    Encounter for surveillance of contraceptive pills  -     AVIANE 0.1-20 mg-mcg per tablet; Take 1 tablet by mouth once daily.    Need for HPV vaccination  -     (In Office Administered) HPV Vaccine (9-Valent) (3 Dose) (IM)    Screening examination for STD (sexually transmitted disease)  -     C. trachomatis/N. gonorrhoeae by AMP DNA    Reviewed updated recommendations for pap smears (every 3 years) in low risk patients.   Recommend annual pelvic exams.  Reviewed recommendations for annual CBE.  Next  pap due in 2021.  Discussed HPV and the vaccine.  She wants to receive it.  First HPV vaccine today.  RTC for nurse visit in 2 months for second injection.

## 2019-08-08 LAB
C TRACH DNA SPEC QL NAA+PROBE: NOT DETECTED
N GONORRHOEA DNA SPEC QL NAA+PROBE: NOT DETECTED

## 2019-08-16 ENCOUNTER — OFFICE VISIT (OUTPATIENT)
Dept: INTERNAL MEDICINE | Facility: CLINIC | Age: 24
End: 2019-08-16
Payer: COMMERCIAL

## 2019-08-16 ENCOUNTER — LAB VISIT (OUTPATIENT)
Dept: LAB | Facility: HOSPITAL | Age: 24
End: 2019-08-16
Payer: COMMERCIAL

## 2019-08-16 VITALS
HEART RATE: 78 BPM | SYSTOLIC BLOOD PRESSURE: 116 MMHG | HEIGHT: 66 IN | DIASTOLIC BLOOD PRESSURE: 70 MMHG | BODY MASS INDEX: 22.53 KG/M2 | OXYGEN SATURATION: 98 % | TEMPERATURE: 97 F | WEIGHT: 140.19 LBS

## 2019-08-16 DIAGNOSIS — Z00.00 ANNUAL PHYSICAL EXAM: Primary | ICD-10-CM

## 2019-08-16 DIAGNOSIS — Z00.00 ANNUAL PHYSICAL EXAM: ICD-10-CM

## 2019-08-16 LAB
ALBUMIN SERPL BCP-MCNC: 4 G/DL (ref 3.5–5.2)
ALP SERPL-CCNC: 36 U/L (ref 55–135)
ALT SERPL W/O P-5'-P-CCNC: 14 U/L (ref 10–44)
ANION GAP SERPL CALC-SCNC: 8 MMOL/L (ref 8–16)
AST SERPL-CCNC: 17 U/L (ref 10–40)
BASOPHILS # BLD AUTO: 0.07 K/UL (ref 0–0.2)
BASOPHILS NFR BLD: 1.2 % (ref 0–1.9)
BILIRUB SERPL-MCNC: 0.2 MG/DL (ref 0.1–1)
BUN SERPL-MCNC: 14 MG/DL (ref 6–20)
CALCIUM SERPL-MCNC: 9.6 MG/DL (ref 8.7–10.5)
CHLORIDE SERPL-SCNC: 103 MMOL/L (ref 95–110)
CHOLEST SERPL-MCNC: 147 MG/DL (ref 120–199)
CHOLEST/HDLC SERPL: 2.4 {RATIO} (ref 2–5)
CO2 SERPL-SCNC: 24 MMOL/L (ref 23–29)
CREAT SERPL-MCNC: 0.9 MG/DL (ref 0.5–1.4)
DIFFERENTIAL METHOD: ABNORMAL
EOSINOPHIL # BLD AUTO: 0.3 K/UL (ref 0–0.5)
EOSINOPHIL NFR BLD: 5.5 % (ref 0–8)
ERYTHROCYTE [DISTWIDTH] IN BLOOD BY AUTOMATED COUNT: 12.2 % (ref 11.5–14.5)
EST. GFR  (AFRICAN AMERICAN): >60 ML/MIN/1.73 M^2
EST. GFR  (NON AFRICAN AMERICAN): >60 ML/MIN/1.73 M^2
GLUCOSE SERPL-MCNC: 96 MG/DL (ref 70–110)
HCT VFR BLD AUTO: 39 % (ref 37–48.5)
HDLC SERPL-MCNC: 61 MG/DL (ref 40–75)
HDLC SERPL: 41.5 % (ref 20–50)
HGB BLD-MCNC: 12.3 G/DL (ref 12–16)
IMM GRANULOCYTES # BLD AUTO: 0.01 K/UL (ref 0–0.04)
IMM GRANULOCYTES NFR BLD AUTO: 0.2 % (ref 0–0.5)
LDLC SERPL CALC-MCNC: 75.8 MG/DL (ref 63–159)
LYMPHOCYTES # BLD AUTO: 2.3 K/UL (ref 1–4.8)
LYMPHOCYTES NFR BLD: 40 % (ref 18–48)
MCH RBC QN AUTO: 31.9 PG (ref 27–31)
MCHC RBC AUTO-ENTMCNC: 31.5 G/DL (ref 32–36)
MCV RBC AUTO: 101 FL (ref 82–98)
MONOCYTES # BLD AUTO: 0.6 K/UL (ref 0.3–1)
MONOCYTES NFR BLD: 10.1 % (ref 4–15)
NEUTROPHILS # BLD AUTO: 2.5 K/UL (ref 1.8–7.7)
NEUTROPHILS NFR BLD: 43 % (ref 38–73)
NONHDLC SERPL-MCNC: 86 MG/DL
NRBC BLD-RTO: 0 /100 WBC
PLATELET # BLD AUTO: 271 K/UL (ref 150–350)
PMV BLD AUTO: 11.5 FL (ref 9.2–12.9)
POTASSIUM SERPL-SCNC: 4.7 MMOL/L (ref 3.5–5.1)
PROT SERPL-MCNC: 6.8 G/DL (ref 6–8.4)
RBC # BLD AUTO: 3.85 M/UL (ref 4–5.4)
SODIUM SERPL-SCNC: 135 MMOL/L (ref 136–145)
TRIGL SERPL-MCNC: 51 MG/DL (ref 30–150)
TSH SERPL DL<=0.005 MIU/L-ACNC: 1.03 UIU/ML (ref 0.4–4)
WBC # BLD AUTO: 5.77 K/UL (ref 3.9–12.7)

## 2019-08-16 PROCEDURE — 99999 PR PBB SHADOW E&M-EST. PATIENT-LVL III: ICD-10-PCS | Mod: PBBFAC,,, | Performed by: INTERNAL MEDICINE

## 2019-08-16 PROCEDURE — 84443 ASSAY THYROID STIM HORMONE: CPT

## 2019-08-16 PROCEDURE — 80053 COMPREHEN METABOLIC PANEL: CPT

## 2019-08-16 PROCEDURE — 99395 PR PREVENTIVE VISIT,EST,18-39: ICD-10-PCS | Mod: S$GLB,,, | Performed by: INTERNAL MEDICINE

## 2019-08-16 PROCEDURE — 99999 PR PBB SHADOW E&M-EST. PATIENT-LVL III: CPT | Mod: PBBFAC,,, | Performed by: INTERNAL MEDICINE

## 2019-08-16 PROCEDURE — 99395 PREV VISIT EST AGE 18-39: CPT | Mod: S$GLB,,, | Performed by: INTERNAL MEDICINE

## 2019-08-16 PROCEDURE — 85025 COMPLETE CBC W/AUTO DIFF WBC: CPT

## 2019-08-16 PROCEDURE — 80061 LIPID PANEL: CPT

## 2019-08-16 PROCEDURE — 36415 COLL VENOUS BLD VENIPUNCTURE: CPT

## 2019-08-16 NOTE — PROGRESS NOTES
Subjective:       Patient ID: Elyse Martinez is a 24 y.o. female.    Chief Complaint: Annual Exam    Elyse Martinez  24 y.o. White female     Patient presents with:  Annual Exam    HPI: Here today for an annual physical. She has no significant complaints.  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   She occasionally has back pain for which she takes prescription naproxen.     TETANUS VACCINE due on 04/07/2013  HPV Vaccines(2 - Female 3-dose series) due on 09/04/2019  Pap Smear due on 07/25/2021  Lipid Panel Completed    Past Medical History:  2013-now: Ovarian cyst    Past Surgical History:  TONSILLECTOMY  6/1/2017: TONSILLECTOMY; Bilateral      Comment:  Performed by Yossi Trujillo MD at [facility] OR  WISDOM TOOTH EXTRACTION; Bilateral    Review of patient's family history indicates:  Problem: Cancer      Relation: Neg Hx          Age of Onset: (Not Specified)  Problem: Diabetes      Relation: Neg Hx          Age of Onset: (Not Specified)  Problem: Heart disease      Relation: Neg Hx          Age of Onset: (Not Specified)  Problem: Hypertension      Relation: Neg Hx          Age of Onset: (Not Specified)  Problem: Kidney disease      Relation: Neg Hx          Age of Onset: (Not Specified)  Problem: Stroke      Relation: Neg Hx          Age of Onset: (Not Specified)  Problem: Breast cancer      Relation: Neg Hx          Age of Onset: (Not Specified)  Problem: Colon cancer      Relation: Neg Hx          Age of Onset: (Not Specified)  Problem: Ovarian cancer      Relation: Neg Hx          Age of Onset: (Not Specified)      Current Outpatient Medications on File Prior to Visit:  AVIANE 0.1-20 mg-mcg per tablet, Take 1 tablet by mouth once daily., Disp: 90 tablet, Rfl: 3  dextroamphetamine-amphetamine (ADDERALL XR) 20 MG 24 hr capsule, TAKE 1 CAPSULE BY MOUTH EVERY DAY IN THE MORNING, Disp: , Rfl: 0  naproxen sodium (ANAPROX) 220 MG tablet, Take 375 mg by mouth 2 (two) times daily as  needed., Disp: , Rfl:     Allergies:  Review of patient's allergies indicates:  No Known Allergies              Review of Systems   Constitutional: Negative for activity change, fever and unexpected weight change.   HENT: Negative for dental problem, hearing loss and trouble swallowing.    Eyes: Negative for visual disturbance.   Respiratory: Negative for cough and shortness of breath.    Cardiovascular: Negative for chest pain, palpitations and leg swelling.   Gastrointestinal: Negative for abdominal pain, constipation and diarrhea.   Genitourinary: Negative for difficulty urinating and menstrual problem.   Musculoskeletal: Positive for back pain (occasionally).   Allergic/Immunologic: Negative for environmental allergies.   Neurological: Negative for dizziness and headaches.   Psychiatric/Behavioral: Positive for decreased concentration (on Adderall for ADHD).       Objective:      Physical Exam   Constitutional: She is oriented to person, place, and time. She appears well-developed and well-nourished. No distress.   HENT:   Mouth/Throat: No oropharyngeal exudate.   Bilateral TM's intact.    Eyes: No scleral icterus.   Neck: No tracheal deviation present. No thyromegaly present.   Cardiovascular: Normal rate, regular rhythm and normal heart sounds.   Pulmonary/Chest: Effort normal and breath sounds normal. No respiratory distress. She has no wheezes. She has no rales.   Abdominal: Soft. Bowel sounds are normal.   Musculoskeletal: She exhibits no edema.   Lymphadenopathy:     She has no cervical adenopathy.   Neurological: She is alert and oriented to person, place, and time.   Skin: Skin is warm and dry.   Psychiatric: She has a normal mood and affect.   Vitals reviewed.      Assessment:       1. Annual physical exam        Plan:       Elyse was seen today for annual exam.    Diagnoses and all orders for this visit:    Annual physical exam  -     Counseled regarding age appropriate screenings and immunizations  -      Counseled regarding lifestyle modifications  -     TSH; Future  -     CBC auto differential; Future  -     Comprehensive metabolic panel; Future  -     Lipid panel; Future    Labs today.     RTC annually and as needed.

## 2019-09-26 DIAGNOSIS — Z30.41 ENCOUNTER FOR SURVEILLANCE OF CONTRACEPTIVE PILLS: ICD-10-CM

## 2019-09-26 RX ORDER — LEVONORGESTREL AND ETHINYL ESTRADIOL 0.1-0.02MG
1 KIT ORAL DAILY
Qty: 28 TABLET | Refills: 1 | Status: SHIPPED | OUTPATIENT
Start: 2019-09-26

## 2019-11-24 DIAGNOSIS — Z30.41 ENCOUNTER FOR SURVEILLANCE OF CONTRACEPTIVE PILLS: ICD-10-CM

## 2019-11-25 ENCOUNTER — PATIENT MESSAGE (OUTPATIENT)
Dept: OBSTETRICS AND GYNECOLOGY | Facility: CLINIC | Age: 24
End: 2019-11-25

## 2019-11-25 RX ORDER — LEVONORGESTREL AND ETHINYL ESTRADIOL 0.1-0.02MG
1 KIT ORAL DAILY
Qty: 28 TABLET | Refills: 11 | Status: SHIPPED | OUTPATIENT
Start: 2019-11-25

## 2019-11-26 DIAGNOSIS — Z30.41 ENCOUNTER FOR SURVEILLANCE OF CONTRACEPTIVE PILLS: ICD-10-CM

## 2019-11-26 RX ORDER — LEVONORGESTREL AND ETHINYL ESTRADIOL 0.1-0.02MG
1 KIT ORAL DAILY
Qty: 28 TABLET | Refills: 1 | Status: SHIPPED | OUTPATIENT
Start: 2019-11-26

## 2019-11-27 NOTE — TELEPHONE ENCOUNTER
Spoke with pt, states that [facility] called her and states that [facility] called to notify her that the medication was refilled.

## 2020-04-21 DIAGNOSIS — Z01.84 ANTIBODY RESPONSE EXAMINATION: ICD-10-CM

## 2020-05-21 DIAGNOSIS — Z01.84 ANTIBODY RESPONSE EXAMINATION: ICD-10-CM

## 2020-06-20 DIAGNOSIS — Z01.84 ANTIBODY RESPONSE EXAMINATION: ICD-10-CM

## 2020-07-20 DIAGNOSIS — Z01.84 ANTIBODY RESPONSE EXAMINATION: ICD-10-CM

## 2020-08-02 DIAGNOSIS — Z30.41 ENCOUNTER FOR SURVEILLANCE OF CONTRACEPTIVE PILLS: ICD-10-CM

## 2020-08-03 RX ORDER — LEVONORGESTREL AND ETHINYL ESTRADIOL 0.1-0.02MG
1 KIT ORAL DAILY
Qty: 90 TABLET | Refills: 3 | Status: SHIPPED | OUTPATIENT
Start: 2020-08-03 | End: 2021-06-28

## 2020-08-19 DIAGNOSIS — Z01.84 ANTIBODY RESPONSE EXAMINATION: ICD-10-CM

## 2020-09-18 DIAGNOSIS — Z01.84 ANTIBODY RESPONSE EXAMINATION: ICD-10-CM

## 2020-10-18 DIAGNOSIS — Z01.84 ANTIBODY RESPONSE EXAMINATION: ICD-10-CM

## 2020-11-17 DIAGNOSIS — Z01.84 ANTIBODY RESPONSE EXAMINATION: ICD-10-CM

## 2021-04-29 ENCOUNTER — PATIENT MESSAGE (OUTPATIENT)
Dept: RESEARCH | Facility: HOSPITAL | Age: 26
End: 2021-04-29

## (undated) DEVICE — GLOVE SURG BIOGEL LATEX SZ 7.5

## (undated) DEVICE — SPONGE DERMACEA 4X4IN 12PLY

## (undated) DEVICE — MANIFOLD 4 PORT

## (undated) DEVICE — SOL 9P NACL IRR PIC IL

## (undated) DEVICE — SEE MEDLINE ITEM 152487

## (undated) DEVICE — WAND XP PROCISE

## (undated) DEVICE — SEE MEDLINE ITEM 152622

## (undated) DEVICE — SEE MEDLINE ITEM 152739

## (undated) DEVICE — SEE MEDLINE ITEM 146347

## (undated) DEVICE — CONTAINER SPECIMEN STRL 4OZ

## (undated) DEVICE — TIP SUCTION COAG PLASMA BLADE

## (undated) DEVICE — SEE MEDLINE ITEM 146292

## (undated) DEVICE — SEE MEDLINE ITEM 146417